# Patient Record
Sex: MALE
[De-identification: names, ages, dates, MRNs, and addresses within clinical notes are randomized per-mention and may not be internally consistent; named-entity substitution may affect disease eponyms.]

---

## 2019-08-13 ENCOUNTER — APPOINTMENT (OUTPATIENT)
Dept: CARDIOLOGY | Facility: CLINIC | Age: 69
End: 2019-08-13
Payer: COMMERCIAL

## 2019-08-13 VITALS
HEART RATE: 61 BPM | BODY MASS INDEX: 25.02 KG/M2 | HEIGHT: 65.35 IN | WEIGHT: 152 LBS | TEMPERATURE: 98.1 F | RESPIRATION RATE: 17 BRPM | SYSTOLIC BLOOD PRESSURE: 123 MMHG | OXYGEN SATURATION: 97 % | DIASTOLIC BLOOD PRESSURE: 73 MMHG

## 2019-08-13 DIAGNOSIS — F17.200 NICOTINE DEPENDENCE, UNSPECIFIED, UNCOMPLICATED: ICD-10-CM

## 2019-08-13 PROCEDURE — 93306 TTE W/DOPPLER COMPLETE: CPT

## 2019-08-13 PROCEDURE — 93015 CV STRESS TEST SUPVJ I&R: CPT

## 2019-08-13 PROCEDURE — ZZZZZ: CPT

## 2019-08-13 PROCEDURE — 99204 OFFICE O/P NEW MOD 45 MIN: CPT | Mod: 25

## 2019-08-13 RX ORDER — TAMSULOSIN HYDROCHLORIDE 0.4 MG/1
0.4 CAPSULE ORAL
Qty: 90 | Refills: 0 | Status: ACTIVE | COMMUNITY
Start: 2019-04-23

## 2019-08-13 RX ORDER — AMLODIPINE BESYLATE 5 MG/1
5 TABLET ORAL
Qty: 30 | Refills: 0 | Status: ACTIVE | COMMUNITY
Start: 2019-04-24

## 2019-08-13 RX ORDER — ACETAMINOPHEN/DIPHENHYDRAMINE 500MG-25MG
81 TABLET ORAL
Qty: 90 | Refills: 0 | Status: ACTIVE | COMMUNITY
Start: 2019-04-24

## 2019-08-13 RX ORDER — FINASTERIDE 5 MG/1
5 TABLET, FILM COATED ORAL
Qty: 90 | Refills: 0 | Status: ACTIVE | COMMUNITY
Start: 2018-10-23

## 2019-08-13 RX ORDER — METFORMIN ER 500 MG 500 MG/1
500 TABLET ORAL
Qty: 90 | Refills: 0 | Status: ACTIVE | COMMUNITY
Start: 2019-04-24

## 2019-08-13 RX ORDER — LISINOPRIL 20 MG/1
20 TABLET ORAL
Qty: 90 | Refills: 0 | Status: ACTIVE | COMMUNITY
Start: 2019-04-24

## 2019-08-13 RX ORDER — ATORVASTATIN CALCIUM 20 MG/1
20 TABLET, FILM COATED ORAL
Qty: 90 | Refills: 0 | Status: ACTIVE | COMMUNITY
Start: 2019-04-24

## 2019-08-15 PROBLEM — F17.200 CURRENT EVERY DAY SMOKER: Status: ACTIVE | Noted: 2019-08-15

## 2019-08-15 NOTE — HISTORY OF PRESENT ILLNESS
[FreeTextEntry1] : 68 year-old male smoker with HTN, DM, HLD, BPH presents for evaluation of CP. Patient reports that for many months he has CP on the L as well as R side that is like sharp and stabbing, not related to exertion lasting seconds. He reports SOB on exertion. Patient denies palpitations. Patient denies h/o syncope. Patient last had CXR over 2 years ago. Patient is on ASA, cholesterol meds. I advised patient to undergo an echocardiogram and a treadmill stress test.

## 2019-08-15 NOTE — PHYSICAL EXAM
[General Appearance - Well Developed] : well developed [Normal Appearance] : normal appearance [Well Groomed] : well groomed [General Appearance - Well Nourished] : well nourished [No Deformities] : no deformities [General Appearance - In No Acute Distress] : no acute distress [Normal Conjunctiva] : the conjunctiva exhibited no abnormalities [Eyelids - No Xanthelasma] : the eyelids demonstrated no xanthelasmas [Normal Oral Mucosa] : normal oral mucosa [No Oral Pallor] : no oral pallor [No Oral Cyanosis] : no oral cyanosis [Normal Jugular Venous A Waves Present] : normal jugular venous A waves present [Normal Jugular Venous V Waves Present] : normal jugular venous V waves present [No Jugular Venous Mendiola A Waves] : no jugular venous mendiola A waves [Heart Rate And Rhythm] : heart rate and rhythm were normal [Heart Sounds] : normal S1 and S2 [Murmurs] : no murmurs present [Respiration, Rhythm And Depth] : normal respiratory rhythm and effort [Exaggerated Use Of Accessory Muscles For Inspiration] : no accessory muscle use [Auscultation Breath Sounds / Voice Sounds] : lungs were clear to auscultation bilaterally [Abdomen Soft] : soft [Abdomen Tenderness] : non-tender [Abdomen Mass (___ Cm)] : no abdominal mass palpated [Abnormal Walk] : normal gait [Gait - Sufficient For Exercise Testing] : the gait was sufficient for exercise testing [Nail Clubbing] : no clubbing of the fingernails [Cyanosis, Localized] : no localized cyanosis [Petechial Hemorrhages (___cm)] : no petechial hemorrhages [] : no ischemic changes [Oriented To Time, Place, And Person] : oriented to person, place, and time [Affect] : the affect was normal [Mood] : the mood was normal [No Anxiety] : not feeling anxious

## 2022-01-06 VITALS
RESPIRATION RATE: 16 BRPM | SYSTOLIC BLOOD PRESSURE: 149 MMHG | OXYGEN SATURATION: 100 % | HEIGHT: 66.14 IN | TEMPERATURE: 97 F | WEIGHT: 149.91 LBS | DIASTOLIC BLOOD PRESSURE: 71 MMHG | HEART RATE: 63 BPM

## 2022-01-06 RX ORDER — CHLORHEXIDINE GLUCONATE 213 G/1000ML
1 SOLUTION TOPICAL ONCE
Refills: 0 | Status: DISCONTINUED | OUTPATIENT
Start: 2022-03-23 | End: 2022-04-06

## 2022-01-06 NOTE — H&P ADULT - ASSESSMENT
70 y/o Mandarin Speaking M, former smoker with PMH of HTN, HLD, DM who presents for left heart cardiac catheterization due to patients risk factors, CCS class III sx, and abnormal NST.           72 y/o Mandarin Speaking M, former smoker with PMH of HTN, HLD, DM who presents for left heart cardiac catheterization due to patients risk factors, CCS class III sx, and abnormal NST.    - EKG:   NSR @ 61 bpm, ICD in III, avF  - ASA:  III         Mallampati: III  - H/H stable:   13.8/40.4    Platelets/Coags stable. Cr: 1.09  - Patient denies hematochieza, hematuria, easy bruising, or signs of bleeding.   - Patient loaded with Aspirin 81 mg PO x 1 and Plavix 600 mg PO x 1   - Patient given 250 cc bolus x 1 and started on IV NS 75 cc/hr x 2 hours   - Patient is a suitable candidate for moderate sedation.     Risks & benefits of procedure and alternative therapy have been explained to the patient including but not limited to: allergic reaction, bleeding w/possible need for blood transfusion, infection, renal and vascular compromise, limb damage, arrhythmia, stroke, vessel dissection/perforation, Myocardial infarction, emergent CABG. Informed consent obtained and in chart.

## 2022-01-06 NOTE — H&P ADULT - NSHPROSALLOTHERNEGRD_GEN_ALL_CORE
What Is The Reason For Today's Visit?: Full Body Skin Examination All other review of systems negative, except as noted in HPI

## 2022-01-06 NOTE — H&P ADULT - HISTORY OF PRESENT ILLNESS
History obtained with help of o/p cardiologist office note.    Cardiologist: Dr. Huizar  [facility] [employer] [facility] [phone]  COVID :  Escort:  Confirm meds on arrival!    70 y/o Mandarin Speaking M, former smoker with PMH of HTN, HLD, DM who presents to his cardiologist Dr. Huizar with CC of intermittent non-radiating substernal chest pain describes it as pressure like which is gradually worsening associated with SOB occurring on ambulating less than 2 blocks or climbing 1 flight of stairs which is worse with exertion and relieved with rest. Pt. denies any dizziness,  palpitation, N/V, LE edema, PND/orthopnea.  NST 8/16/21 revealed small amount of ischemia in inferior location; 1 mm ST depression 2,3, AVF. ECHO7/26/21 revealing EF 70 %, mildly dilated ascending aorta 3.9 cm, AVS with mild calcification, mild to mod TR, mild PI, mildly dilated LA/RA. In light of patients risk factors, CCS class __ sx and abnormal NST, pt. is now referred for cardiac cath with possible intervention   History obtained with help of o/p cardiologist office note  Cardiologist: Dr. Huizar  Pharmacy: [facility] [phone]  COVID: (-) 3/23/22 in Patient's Packet   Escort: Daughter   70 y/o Mandarin Speaking M, former smoker with PMH of HTN, HLD, DM who presents to his cardiologist Dr. Huizar with CC of intermittent non-radiating substernal chest pain describes it as pressure like which is gradually worsening associated with SOB occurring on ambulating less than 2 blocks or climbing 1 flight of stairs which is worse with exertion and relieved with rest. Pt. denies any dizziness,  palpitation, N/V, LE edema, PND/orthopnea.  NST 8/16/21 revealed small amount of ischemia in inferior location; 1 mm ST depression 2,3, AVF. ECHO7/26/21 revealing EF 70 %, mildly dilated ascending aorta 3.9 cm, AVS with mild calcification, mild to mod TR, mild PI, mildly dilated LA/RA. In light of patients risk factors, CCS class III sx, and abnormal NST, pt. is now referred for cardiac cath with possible intervention

## 2022-01-06 NOTE — H&P ADULT - RS GEN PE MLT RESP DETAILS PC
airway patent/good air movement/respirations non-labored/clear to auscultation bilaterally/no chest wall tenderness/no intercostal retractions/no rales

## 2022-03-23 ENCOUNTER — OUTPATIENT (OUTPATIENT)
Dept: OUTPATIENT SERVICES | Facility: HOSPITAL | Age: 72
LOS: 1 days | Discharge: HOME | End: 2022-03-23
Payer: COMMERCIAL

## 2022-03-23 LAB
A1C WITH ESTIMATED AVERAGE GLUCOSE RESULT: 6.2 % — HIGH (ref 4–5.6)
ALBUMIN SERPL ELPH-MCNC: 4.8 G/DL — SIGNIFICANT CHANGE UP (ref 3.3–5)
ALP SERPL-CCNC: 62 U/L — SIGNIFICANT CHANGE UP (ref 40–120)
ALT FLD-CCNC: 29 U/L — SIGNIFICANT CHANGE UP (ref 10–45)
ANION GAP SERPL CALC-SCNC: 9 MMOL/L — SIGNIFICANT CHANGE UP (ref 5–17)
APTT BLD: 33.8 SEC — SIGNIFICANT CHANGE UP (ref 27.5–35.5)
AST SERPL-CCNC: 29 U/L — SIGNIFICANT CHANGE UP (ref 10–40)
BASOPHILS # BLD AUTO: 0.04 K/UL — SIGNIFICANT CHANGE UP (ref 0–0.2)
BASOPHILS NFR BLD AUTO: 0.7 % — SIGNIFICANT CHANGE UP (ref 0–2)
BILIRUB DIRECT SERPL-MCNC: <0.2 MG/DL — SIGNIFICANT CHANGE UP (ref 0–0.3)
BILIRUB INDIRECT FLD-MCNC: SIGNIFICANT CHANGE UP MG/DL (ref 0.2–1)
BILIRUB SERPL-MCNC: 0.6 MG/DL — SIGNIFICANT CHANGE UP (ref 0.2–1.2)
BUN SERPL-MCNC: 19 MG/DL — SIGNIFICANT CHANGE UP (ref 7–23)
CALCIUM SERPL-MCNC: 9.6 MG/DL — SIGNIFICANT CHANGE UP (ref 8.4–10.5)
CHLORIDE SERPL-SCNC: 102 MMOL/L — SIGNIFICANT CHANGE UP (ref 96–108)
CHOLEST SERPL-MCNC: 160 MG/DL — SIGNIFICANT CHANGE UP
CO2 SERPL-SCNC: 26 MMOL/L — SIGNIFICANT CHANGE UP (ref 22–31)
CREAT SERPL-MCNC: 1.09 MG/DL — SIGNIFICANT CHANGE UP (ref 0.5–1.3)
EGFR: 73 ML/MIN/1.73M2 — SIGNIFICANT CHANGE UP
EOSINOPHIL # BLD AUTO: 0.13 K/UL — SIGNIFICANT CHANGE UP (ref 0–0.5)
EOSINOPHIL NFR BLD AUTO: 2.1 % — SIGNIFICANT CHANGE UP (ref 0–6)
ESTIMATED AVERAGE GLUCOSE: 131 MG/DL — HIGH (ref 68–114)
GLUCOSE BLDC GLUCOMTR-MCNC: 95 MG/DL — SIGNIFICANT CHANGE UP (ref 70–99)
GLUCOSE SERPL-MCNC: 113 MG/DL — HIGH (ref 70–99)
HCT VFR BLD CALC: 40.4 % — SIGNIFICANT CHANGE UP (ref 39–50)
HDLC SERPL-MCNC: 50 MG/DL — SIGNIFICANT CHANGE UP
HGB BLD-MCNC: 13.8 G/DL — SIGNIFICANT CHANGE UP (ref 13–17)
IMM GRANULOCYTES NFR BLD AUTO: 0.2 % — SIGNIFICANT CHANGE UP (ref 0–1.5)
INR BLD: 0.96 — SIGNIFICANT CHANGE UP (ref 0.88–1.16)
LIPID PNL WITH DIRECT LDL SERPL: 87 MG/DL — SIGNIFICANT CHANGE UP
LYMPHOCYTES # BLD AUTO: 1.88 K/UL — SIGNIFICANT CHANGE UP (ref 1–3.3)
LYMPHOCYTES # BLD AUTO: 31 % — SIGNIFICANT CHANGE UP (ref 13–44)
MCHC RBC-ENTMCNC: 31.1 PG — SIGNIFICANT CHANGE UP (ref 27–34)
MCHC RBC-ENTMCNC: 34.2 GM/DL — SIGNIFICANT CHANGE UP (ref 32–36)
MCV RBC AUTO: 91 FL — SIGNIFICANT CHANGE UP (ref 80–100)
MONOCYTES # BLD AUTO: 0.51 K/UL — SIGNIFICANT CHANGE UP (ref 0–0.9)
MONOCYTES NFR BLD AUTO: 8.4 % — SIGNIFICANT CHANGE UP (ref 2–14)
NEUTROPHILS # BLD AUTO: 3.49 K/UL — SIGNIFICANT CHANGE UP (ref 1.8–7.4)
NEUTROPHILS NFR BLD AUTO: 57.6 % — SIGNIFICANT CHANGE UP (ref 43–77)
NON HDL CHOLESTEROL: 110 MG/DL — SIGNIFICANT CHANGE UP
NRBC # BLD: 0 /100 WBCS — SIGNIFICANT CHANGE UP (ref 0–0)
PLATELET # BLD AUTO: 220 K/UL — SIGNIFICANT CHANGE UP (ref 150–400)
POTASSIUM SERPL-MCNC: 4.4 MMOL/L — SIGNIFICANT CHANGE UP (ref 3.5–5.3)
POTASSIUM SERPL-SCNC: 4.4 MMOL/L — SIGNIFICANT CHANGE UP (ref 3.5–5.3)
PROT SERPL-MCNC: 7.8 G/DL — SIGNIFICANT CHANGE UP (ref 6–8.3)
PROTHROM AB SERPL-ACNC: 11.4 SEC — SIGNIFICANT CHANGE UP (ref 10.5–13.4)
RBC # BLD: 4.44 M/UL — SIGNIFICANT CHANGE UP (ref 4.2–5.8)
RBC # FLD: 12.3 % — SIGNIFICANT CHANGE UP (ref 10.3–14.5)
SODIUM SERPL-SCNC: 137 MMOL/L — SIGNIFICANT CHANGE UP (ref 135–145)
TRIGL SERPL-MCNC: 114 MG/DL — SIGNIFICANT CHANGE UP
WBC # BLD: 6.06 K/UL — SIGNIFICANT CHANGE UP (ref 3.8–10.5)
WBC # FLD AUTO: 6.06 K/UL — SIGNIFICANT CHANGE UP (ref 3.8–10.5)

## 2022-03-23 PROCEDURE — 93005 ELECTROCARDIOGRAM TRACING: CPT

## 2022-03-23 PROCEDURE — C1769: CPT

## 2022-03-23 PROCEDURE — 85025 COMPLETE CBC W/AUTO DIFF WBC: CPT

## 2022-03-23 PROCEDURE — 99152 MOD SED SAME PHYS/QHP 5/>YRS: CPT

## 2022-03-23 PROCEDURE — 80053 COMPREHEN METABOLIC PANEL: CPT

## 2022-03-23 PROCEDURE — 93458 L HRT ARTERY/VENTRICLE ANGIO: CPT | Mod: 26

## 2022-03-23 PROCEDURE — 93458 L HRT ARTERY/VENTRICLE ANGIO: CPT

## 2022-03-23 PROCEDURE — C1887: CPT

## 2022-03-23 PROCEDURE — 82962 GLUCOSE BLOOD TEST: CPT

## 2022-03-23 PROCEDURE — 82248 BILIRUBIN DIRECT: CPT

## 2022-03-23 PROCEDURE — C1894: CPT

## 2022-03-23 PROCEDURE — 85730 THROMBOPLASTIN TIME PARTIAL: CPT

## 2022-03-23 PROCEDURE — 93010 ELECTROCARDIOGRAM REPORT: CPT

## 2022-03-23 PROCEDURE — 85610 PROTHROMBIN TIME: CPT

## 2022-03-23 PROCEDURE — 83036 HEMOGLOBIN GLYCOSYLATED A1C: CPT

## 2022-03-23 PROCEDURE — 80061 LIPID PANEL: CPT

## 2022-03-23 RX ORDER — DEXTROSE 50 % IN WATER 50 %
12.5 SYRINGE (ML) INTRAVENOUS ONCE
Refills: 0 | Status: DISCONTINUED | OUTPATIENT
Start: 2022-03-23 | End: 2022-04-06

## 2022-03-23 RX ORDER — INSULIN LISPRO 100/ML
VIAL (ML) SUBCUTANEOUS ONCE
Refills: 0 | Status: DISCONTINUED | OUTPATIENT
Start: 2022-03-23 | End: 2022-04-06

## 2022-03-23 RX ORDER — GLUCAGON INJECTION, SOLUTION 0.5 MG/.1ML
1 INJECTION, SOLUTION SUBCUTANEOUS ONCE
Refills: 0 | Status: DISCONTINUED | OUTPATIENT
Start: 2022-03-23 | End: 2022-04-06

## 2022-03-23 RX ORDER — SODIUM CHLORIDE 9 MG/ML
1000 INJECTION, SOLUTION INTRAVENOUS
Refills: 0 | Status: DISCONTINUED | OUTPATIENT
Start: 2022-03-23 | End: 2022-04-06

## 2022-03-23 RX ORDER — CLOPIDOGREL BISULFATE 75 MG/1
600 TABLET, FILM COATED ORAL ONCE
Refills: 0 | Status: COMPLETED | OUTPATIENT
Start: 2022-03-23 | End: 2022-03-23

## 2022-03-23 RX ORDER — DEXTROSE 50 % IN WATER 50 %
15 SYRINGE (ML) INTRAVENOUS ONCE
Refills: 0 | Status: DISCONTINUED | OUTPATIENT
Start: 2022-03-23 | End: 2022-04-06

## 2022-03-23 RX ORDER — DEXTROSE 50 % IN WATER 50 %
25 SYRINGE (ML) INTRAVENOUS ONCE
Refills: 0 | Status: DISCONTINUED | OUTPATIENT
Start: 2022-03-23 | End: 2022-04-06

## 2022-03-23 RX ORDER — SODIUM CHLORIDE 9 MG/ML
250 INJECTION INTRAMUSCULAR; INTRAVENOUS; SUBCUTANEOUS ONCE
Refills: 0 | Status: COMPLETED | OUTPATIENT
Start: 2022-03-23 | End: 2022-03-23

## 2022-03-23 RX ORDER — SODIUM CHLORIDE 9 MG/ML
500 INJECTION INTRAMUSCULAR; INTRAVENOUS; SUBCUTANEOUS
Refills: 0 | Status: DISCONTINUED | OUTPATIENT
Start: 2022-03-23 | End: 2022-04-06

## 2022-03-23 RX ORDER — ASPIRIN/CALCIUM CARB/MAGNESIUM 324 MG
81 TABLET ORAL ONCE
Refills: 0 | Status: COMPLETED | OUTPATIENT
Start: 2022-03-23 | End: 2022-03-23

## 2022-03-23 RX ADMIN — SODIUM CHLORIDE 210 MILLILITER(S): 9 INJECTION INTRAMUSCULAR; INTRAVENOUS; SUBCUTANEOUS at 13:30

## 2022-03-23 RX ADMIN — SODIUM CHLORIDE 250 MILLILITER(S): 9 INJECTION INTRAMUSCULAR; INTRAVENOUS; SUBCUTANEOUS at 11:32

## 2022-03-23 RX ADMIN — CLOPIDOGREL BISULFATE 600 MILLIGRAM(S): 75 TABLET, FILM COATED ORAL at 11:37

## 2022-03-23 RX ADMIN — Medication 81 MILLIGRAM(S): at 11:37

## 2022-03-23 NOTE — PROGRESS NOTE ADULT - SUBJECTIVE AND OBJECTIVE BOX
Interventional Cardiology PA SDA Discharge Note    Patient without complaints. Ambulated and voided without difficulties    Afebrile, VSS    Ext:  Right             Radial :   no hematoma,    no bleeding, dressing; C/D/I      Pulses:    intact RAD to baseline     A/P:    70 y/o Mandarin Speaking M, former smoker with PMH of HTN, HLD, DM who presents to his cardiologist Dr. Huizar with CC of intermittent non-radiating substernal chest pain describes it as pressure like which is gradually worsening associated with SOB occurring on ambulating less than 2 blocks or climbing 1 flight of stairs which is worse with exertion and relieved with rest. Pt. denies any dizziness,  palpitation, N/V, LE edema, PND/orthopnea.  NST 8/16/21 revealed small amount of ischemia in inferior location; 1 mm ST depression 2,3, AVF. ECHO7/26/21 revealing EF 70 %, mildly dilated ascending aorta 3.9 cm, AVS with mild calcification, mild to mod TR, mild PI, mildly dilated LA/RA. In light of patients risk factors, CCS class III sx, and abnormal NST, pt. is now referred for cardiac cath with possible intervention    - EKG:   NSR @ 61 bpm, ICD in III, avF  - ASA:  III         Mallampati: III  - H/H stable:   13.8/40.4    Platelets/Coags stable. Cr: 1.09  - Patient denies hematochieza, hematuria, easy bruising, or signs of bleeding.   - Patient loaded with Aspirin 81 mg PO x 1 and Plavix 600 mg PO x 1   - Patient given 250 cc bolus x 1 and started on IV NS 75 cc/hr x 2 hours   - Patient is a suitable candidate for moderate sedation.     Cardiac cath 3/23/22: Non-obstructive CAD (full cath report pending); LVEDP 6mmHg.  -Access: R radial artery s/p TR band; no hematoma/bleed.    - Post cath IV NS @210cc/hr x2hrs ordered per protocol.    Pt instructed to hold home Metformin until Saturday, 3/26/22, at which time he should resume taking it as prescribed.    1.	Stable for discharge as per attending Dr. Huizar after bed rest, pt voids, groin/wrist stable and 30 minutes of ambulation.  2.	Follow-up with PMD/Cardiologist Dr. Huizar in 1-2 weeks  3.	Discharged forms signed and copies in chart

## 2022-03-28 DIAGNOSIS — I25.110 ATHEROSCLEROTIC HEART DISEASE OF NATIVE CORONARY ARTERY WITH UNSTABLE ANGINA PECTORIS: ICD-10-CM

## 2022-03-28 DIAGNOSIS — R94.39 ABNORMAL RESULT OF OTHER CARDIOVASCULAR FUNCTION STUDY: ICD-10-CM

## 2022-10-03 ENCOUNTER — APPOINTMENT (OUTPATIENT)
Dept: UROLOGY | Facility: CLINIC | Age: 72
End: 2022-10-03

## 2022-10-03 VITALS
TEMPERATURE: 97.3 F | DIASTOLIC BLOOD PRESSURE: 77 MMHG | RESPIRATION RATE: 18 BRPM | HEART RATE: 63 BPM | HEIGHT: 64.96 IN | OXYGEN SATURATION: 98 % | WEIGHT: 154 LBS | SYSTOLIC BLOOD PRESSURE: 132 MMHG | BODY MASS INDEX: 25.66 KG/M2

## 2022-10-03 DIAGNOSIS — F17.200 NICOTINE DEPENDENCE, UNSPECIFIED, UNCOMPLICATED: ICD-10-CM

## 2022-10-03 DIAGNOSIS — Z78.9 OTHER SPECIFIED HEALTH STATUS: ICD-10-CM

## 2022-10-03 PROCEDURE — 99204 OFFICE O/P NEW MOD 45 MIN: CPT

## 2022-10-03 RX ORDER — ENEMA 19; 7 G/133ML; G/133ML
7-19 ENEMA RECTAL
Qty: 133 | Refills: 0 | Status: ACTIVE | COMMUNITY
Start: 2022-10-03 | End: 1900-01-01

## 2022-10-03 NOTE — PHYSICAL EXAM
[General Appearance - Well Developed] : well developed [General Appearance - Well Nourished] : well nourished [Normal Appearance] : normal appearance [Well Groomed] : well groomed [General Appearance - In No Acute Distress] : no acute distress [Edema] : no peripheral edema [Respiration, Rhythm And Depth] : normal respiratory rhythm and effort [Exaggerated Use Of Accessory Muscles For Inspiration] : no accessory muscle use [Abdomen Soft] : soft [Abdomen Tenderness] : non-tender [Costovertebral Angle Tenderness] : no ~M costovertebral angle tenderness [Urethral Meatus] : meatus normal [Penis Abnormality] : normal uncircumcised penis [Urinary Bladder Findings] : the bladder was normal on palpation [Scrotum] : the scrotum was normal [Testes Tenderness] : no tenderness of the testes [Testes Mass (___cm)] : there were no testicular masses [Prostate Tenderness] : the prostate was not tender [No Prostate Nodules] : no prostate nodules [Prostate Size ___ gm] : prostate size [unfilled] gm [Normal Station and Gait] : the gait and station were normal for the patient's age [] : no rash [No Focal Deficits] : no focal deficits [Oriented To Time, Place, And Person] : oriented to person, place, and time [Affect] : the affect was normal [Mood] : the mood was normal [Not Anxious] : not anxious

## 2022-10-06 ENCOUNTER — APPOINTMENT (OUTPATIENT)
Age: 72
End: 2022-10-06

## 2022-10-06 LAB — PSA SERPL-MCNC: 6.87 NG/ML

## 2022-10-20 NOTE — HISTORY OF PRESENT ILLNESS
[FreeTextEntry1] : Patient is a 70 yo M who presents for BPH/LUTS and elevated PSA.\par \par He had recent labs with PCP showing PSA of 4.91 in 9/2022.  He denies any knowledge of prior elevated PSAs.\par \par He also reports recently worsened LUTS over past year.  Notes nocturia x2, urinary frequency, urgency.  Rare UUI.  Denies dysuria, hematuria.  Overall good FOS.\par \par He previously took flomax and finasteride.  However he states he stopped these medications about 2 years ago.  Currently not taking any BPH medications.

## 2022-10-20 NOTE — ASSESSMENT
[FreeTextEntry1] : Patient is a 70 yo M who presents for BPH/LUTs and elevated PSA.\par \par Previously on combo BPH therapy but stopped as he was better, now worsened LUTS.\par Combo BPH therapy re-initiated, rxs sent\par Repeat PSA today\par Prostate MRI\par F/u after MRI

## 2022-10-20 NOTE — ADDENDUM
[FreeTextEntry1] : Called and spoke to pt re MRI results\par Equivocal PiRADs 3 lesion, 43cc prostate.  Repeat PSA was 6.87.\par D/w pt that recommend he follow up with my partner Dr Waldrop who can perform MRI fusion transperineal bx in our [place] office\par He will need to arrange transport, but states willing to proceed\par Dr Waldrop/team notified and to arrange appt

## 2022-10-21 ENCOUNTER — NON-APPOINTMENT (OUTPATIENT)
Age: 72
End: 2022-10-21

## 2022-10-21 RX ORDER — SODIUM PHOSPHATE, DIBASIC AND SODIUM PHOSPHATE, MONOBASIC 7; 19 G/230ML; G/230ML
ENEMA RECTAL
Qty: 1 | Refills: 0 | Status: ACTIVE | COMMUNITY
Start: 2022-10-21 | End: 1900-01-01

## 2022-10-25 ENCOUNTER — NON-APPOINTMENT (OUTPATIENT)
Age: 72
End: 2022-10-25

## 2022-10-25 ENCOUNTER — OUTPATIENT (OUTPATIENT)
Dept: OUTPATIENT SERVICES | Facility: HOSPITAL | Age: 72
LOS: 1 days | End: 2022-10-25
Payer: SELF-PAY

## 2022-10-25 DIAGNOSIS — Z00.8 ENCOUNTER FOR OTHER GENERAL EXAMINATION: ICD-10-CM

## 2022-10-25 PROCEDURE — C8001: CPT

## 2022-11-06 DIAGNOSIS — Z00.00 ENCOUNTER FOR GENERAL ADULT MEDICAL EXAMINATION W/OUT ABNORMAL FINDINGS: ICD-10-CM

## 2022-11-07 ENCOUNTER — APPOINTMENT (OUTPATIENT)
Dept: UROLOGY | Facility: CLINIC | Age: 72
End: 2022-11-07

## 2022-11-07 ENCOUNTER — OUTPATIENT (OUTPATIENT)
Dept: OUTPATIENT SERVICES | Facility: HOSPITAL | Age: 72
LOS: 1 days | End: 2022-11-07
Payer: COMMERCIAL

## 2022-11-07 DIAGNOSIS — R35.0 FREQUENCY OF MICTURITION: ICD-10-CM

## 2022-11-07 PROCEDURE — 76377 3D RENDER W/INTRP POSTPROCES: CPT | Mod: 26

## 2022-11-07 PROCEDURE — 76872 US TRANSRECTAL: CPT | Mod: 26

## 2022-11-07 PROCEDURE — 76942 ECHO GUIDE FOR BIOPSY: CPT | Mod: 26,59

## 2022-11-07 PROCEDURE — 55700: CPT

## 2022-11-07 PROCEDURE — 76872 US TRANSRECTAL: CPT

## 2022-11-08 ENCOUNTER — NON-APPOINTMENT (OUTPATIENT)
Age: 72
End: 2022-11-08

## 2022-11-08 DIAGNOSIS — R97.20 ELEVATED PROSTATE SPECIFIC ANTIGEN [PSA]: ICD-10-CM

## 2022-11-15 ENCOUNTER — APPOINTMENT (OUTPATIENT)
Dept: UROLOGY | Facility: CLINIC | Age: 72
End: 2022-11-15

## 2022-11-15 VITALS
SYSTOLIC BLOOD PRESSURE: 150 MMHG | HEART RATE: 69 BPM | RESPIRATION RATE: 17 BRPM | HEIGHT: 68 IN | WEIGHT: 168 LBS | BODY MASS INDEX: 25.46 KG/M2 | DIASTOLIC BLOOD PRESSURE: 75 MMHG

## 2022-11-15 PROCEDURE — 99214 OFFICE O/P EST MOD 30 MIN: CPT

## 2022-11-15 NOTE — HISTORY OF PRESENT ILLNESS
[FreeTextEntry1] : Patient is a 70 yo M who presents for BPH/LUTS and elevated PSA.\par \par He had recent labs with PCP showing PSA of 4.91 in 9/2022.  He denies any knowledge of prior elevated PSAs.\par Repeat PSA 6.87 - he underwent prostate MRI showing PiRADs 3 43cc gland.\par He underwent transperineal fusion pbx.  Pathology showed diffuse R sided cancer, [place] 6 and Linda 3+4\par \par He also reports recently worsened LUTS over past year.  Notes nocturia x2, urinary frequency, urgency.  Rare UUI.  Denies dysuria, hematuria.  Overall good FOS.\par \par He previously took flomax and finasteride.  However he states he stopped these medications about 2 years ago.  Currently not taking any BPH medications.

## 2022-11-15 NOTE — ASSESSMENT
[FreeTextEntry1] : Patient is a 70 yo M who presents for BPH/LUTs and elevated PSA.\par S/p prostate MRI with PiRADs 3 43cc gland.\par He underwent transperineal fusion pbx.  Pathology showed diffuse R sided cancer, Linda 6 and Linda 3+4\par \par Pathology results reviewed with pt\par He is favorable-intermediate risk GG2.\par I had a long discussion with pt regarding his prostate cancer diagnosis, implications and options for tx of his disease, including AS, surgery, ablation, and radiation. Given his life expectancy likely exceeds >10yrs, I recommend treatment either with surgery, ablative/cryo therapy or radiation. \par I discussed that there are risks of both radiation and surgery.  Surgical risks are immediate and improve with time, whereas radiation risks increase with time.  I discuss with pt that risks of incontinence are immediate but most pts regain incontinence over time.  Radiation will initially have minimal side effects, but they develop over >5 yrs.  However radiation side effects are very difficult to treat. \par Cryo similar SE to radiation although potentiallly less\par \par He wishes to discuss options further.\par Refer to Rad Onc\par Refer to Dr Jon for discussion of cryo, RALP\par Fu Dr Jon

## 2022-11-18 PROBLEM — Z00.00 ENCOUNTER FOR PREVENTIVE HEALTH EXAMINATION: Noted: 2022-11-18

## 2022-11-29 ENCOUNTER — APPOINTMENT (OUTPATIENT)
Dept: UROLOGY | Facility: CLINIC | Age: 72
End: 2022-11-29

## 2022-11-29 PROCEDURE — 99215 OFFICE O/P EST HI 40 MIN: CPT

## 2022-12-03 NOTE — DISEASE MANAGEMENT
[1] : T1 [c] : c [0] : N0 [X] : MX [Biopsy with Fusion] : Patient had a biopsy with fusion on [7(3+4)] : Template Biopsy [place] Score: 7(3+4) [Biopsy results sent to PCP/Referring Physician] : Biopsy results sent to PCP/Referring Physician [] : Patient had a Prostate MRI [3] : 3 [IIB] : IIB [BiopsyDate] : 11/22 [MeasuredProstateVolume] : 43.2 [TotalCores] : 14 [TotalPositiveCores] : 5 [MaxCoreInvolvement] : 50

## 2022-12-03 NOTE — LETTER CLOSING
[Consult Closing:] : Thank you for allowing me to participate in the care of this patient.  If you have any questions, please do not hesitate to contact me. [FreeTextEntry3] : Sincerely,\par \par \par \par \par Bear Jon MD, FACS\par Chief of Urology, [facility]\par  of Urology\par Director of Robotic and Laparoscopic Surgery\par [facility]\par \par [facility]\par [address] [place] [address]\[place] [address]\par P: [phone]\par F: [phone]\par [facility]

## 2022-12-03 NOTE — HISTORY OF PRESENT ILLNESS
[FreeTextEntry1] : The entirety of the visit today was conducted with a Chinese  and the patient was present along with his wife and son-in-law.\par \par Gerardo Gross presents to the office today.  He is a 72-year-old man recently diagnosed with Linda 3+4 adenocarcinoma the prostate.  His biopsy was done at the beginning of this month.  He had an MRI target lesion, PI-RADS category 3 measuring 5 mm on MRI done earlier this year.  The targeted biopsies were negative but he did have several samples from the right aspect of the prostate with Linda 3+4.  These involve the right posterior medial apex and base as well as the left posterior lateral apex and base.  A sample from the right anterior prostate was also positive.  In total, 5 samples from the template biopsy were positive, all from the right side of the prostate.  \par \par The prostate measures 43 cm³ on MRI.  Baseline urinary symptoms include nocturia x2 along with urinary frequency and urgency.  He is not currently on any medical treatment for urinary symptoms although previously he had taken Flomax and finasteride.\par \par Dr. Reyes has already reviewed the pathology results from the recent biopsy with the patient.  The patient has been referred to radiation oncology to discuss potential radiation treatment options for this prostate cancer and he is here with me today to discuss either focal cryoablation or radical prostatectomy to manage this prostate cancer.

## 2022-12-03 NOTE — LETTER GREETING
[Dear  ___] : Dear  [unfilled], [Follow-Up] : Your patient, [unfilled] was seen in my office today for follow-up [Please see my note below.] : Please see my note below. [FreeTextEntry2] : Kamini Gross. DO\par [address] [place] [address]

## 2022-12-13 ENCOUNTER — OUTPATIENT (OUTPATIENT)
Dept: OUTPATIENT SERVICES | Facility: HOSPITAL | Age: 72
LOS: 1 days | End: 2022-12-13

## 2022-12-13 VITALS
OXYGEN SATURATION: 98 % | DIASTOLIC BLOOD PRESSURE: 82 MMHG | RESPIRATION RATE: 16 BRPM | HEART RATE: 67 BPM | SYSTOLIC BLOOD PRESSURE: 163 MMHG | WEIGHT: 149.91 LBS | HEIGHT: 65 IN | TEMPERATURE: 99 F

## 2022-12-13 DIAGNOSIS — C61 MALIGNANT NEOPLASM OF PROSTATE: ICD-10-CM

## 2022-12-13 DIAGNOSIS — I10 ESSENTIAL (PRIMARY) HYPERTENSION: ICD-10-CM

## 2022-12-13 DIAGNOSIS — E11.9 TYPE 2 DIABETES MELLITUS WITHOUT COMPLICATIONS: ICD-10-CM

## 2022-12-13 DIAGNOSIS — K08.89 OTHER SPECIFIED DISORDERS OF TEETH AND SUPPORTING STRUCTURES: ICD-10-CM

## 2022-12-13 LAB
A1C WITH ESTIMATED AVERAGE GLUCOSE RESULT: 6.1 % — HIGH (ref 4–5.6)
ALBUMIN SERPL ELPH-MCNC: 5 G/DL — SIGNIFICANT CHANGE UP (ref 3.3–5)
ALP SERPL-CCNC: 71 U/L — SIGNIFICANT CHANGE UP (ref 40–120)
ALT FLD-CCNC: 18 U/L — SIGNIFICANT CHANGE UP (ref 4–41)
ANION GAP SERPL CALC-SCNC: 12 MMOL/L — SIGNIFICANT CHANGE UP (ref 7–14)
AST SERPL-CCNC: 19 U/L — SIGNIFICANT CHANGE UP (ref 4–40)
BILIRUB SERPL-MCNC: 0.6 MG/DL — SIGNIFICANT CHANGE UP (ref 0.2–1.2)
BUN SERPL-MCNC: 16 MG/DL — SIGNIFICANT CHANGE UP (ref 7–23)
CALCIUM SERPL-MCNC: 10.3 MG/DL — SIGNIFICANT CHANGE UP (ref 8.4–10.5)
CHLORIDE SERPL-SCNC: 96 MMOL/L — LOW (ref 98–107)
CO2 SERPL-SCNC: 25 MMOL/L — SIGNIFICANT CHANGE UP (ref 22–31)
CREAT SERPL-MCNC: 1.19 MG/DL — SIGNIFICANT CHANGE UP (ref 0.5–1.3)
EGFR: 66 ML/MIN/1.73M2 — SIGNIFICANT CHANGE UP
ESTIMATED AVERAGE GLUCOSE: 128 — SIGNIFICANT CHANGE UP
GLUCOSE SERPL-MCNC: 131 MG/DL — HIGH (ref 70–99)
HCT VFR BLD CALC: 39.7 % — SIGNIFICANT CHANGE UP (ref 39–50)
HGB BLD-MCNC: 13.1 G/DL — SIGNIFICANT CHANGE UP (ref 13–17)
MCHC RBC-ENTMCNC: 30 PG — SIGNIFICANT CHANGE UP (ref 27–34)
MCHC RBC-ENTMCNC: 33 GM/DL — SIGNIFICANT CHANGE UP (ref 32–36)
MCV RBC AUTO: 90.8 FL — SIGNIFICANT CHANGE UP (ref 80–100)
NRBC # BLD: 0 /100 WBCS — SIGNIFICANT CHANGE UP (ref 0–0)
NRBC # FLD: 0 K/UL — SIGNIFICANT CHANGE UP (ref 0–0)
PLATELET # BLD AUTO: 238 K/UL — SIGNIFICANT CHANGE UP (ref 150–400)
POTASSIUM SERPL-MCNC: 4.4 MMOL/L — SIGNIFICANT CHANGE UP (ref 3.5–5.3)
POTASSIUM SERPL-SCNC: 4.4 MMOL/L — SIGNIFICANT CHANGE UP (ref 3.5–5.3)
PROT SERPL-MCNC: 7.9 G/DL — SIGNIFICANT CHANGE UP (ref 6–8.3)
RBC # BLD: 4.37 M/UL — SIGNIFICANT CHANGE UP (ref 4.2–5.8)
RBC # FLD: 12.1 % — SIGNIFICANT CHANGE UP (ref 10.3–14.5)
SODIUM SERPL-SCNC: 133 MMOL/L — LOW (ref 135–145)
WBC # BLD: 6.51 K/UL — SIGNIFICANT CHANGE UP (ref 3.8–10.5)
WBC # FLD AUTO: 6.51 K/UL — SIGNIFICANT CHANGE UP (ref 3.8–10.5)

## 2022-12-13 PROCEDURE — 93010 ELECTROCARDIOGRAM REPORT: CPT

## 2022-12-13 RX ORDER — AMLODIPINE BESYLATE 2.5 MG/1
1 TABLET ORAL
Qty: 0 | Refills: 0 | DISCHARGE

## 2022-12-13 RX ORDER — METOPROLOL TARTRATE 50 MG
1 TABLET ORAL
Qty: 0 | Refills: 0 | DISCHARGE

## 2022-12-13 NOTE — H&P PST ADULT - OTHER CARE PROVIDERS
Dr. Connelly or Dr. Garcia,, Dr. Jay Ochoa  Dentist [phone] Dr. Connelly or Dr. Garcia,, Dr. Jay Ochoa  [facility] [phone]; Dr. Connelly or Dr. Garcia,, Dr. Jay Ochoa  Dentist [phone]

## 2022-12-13 NOTE — H&P PST ADULT - LAST CARDIAC ANGIOGRAM/IMAGING
"they stuck a needle In my arm to check my heart, it was this year" "some hospital in [place]" 2022

## 2022-12-13 NOTE — H&P PST ADULT - NSANTHOSAYNRD_GEN_A_CORE
No. MAEVE screening performed.  STOP BANG Legend: 0-2 = LOW Risk; 3-4 = INTERMEDIATE Risk; 5-8 = HIGH Risk

## 2022-12-13 NOTE — H&P PST ADULT - NSICDXPASTMEDICALHX_GEN_ALL_CORE_FT
PAST MEDICAL HISTORY:  HTN (hypertension)     Hyperlipidemia     Prostate cancer     Type II diabetes mellitus

## 2022-12-20 ENCOUNTER — APPOINTMENT (OUTPATIENT)
Dept: UROLOGY | Facility: CLINIC | Age: 72
End: 2022-12-20

## 2022-12-20 VITALS
WEIGHT: 152 LBS | HEART RATE: 69 BPM | BODY MASS INDEX: 23.11 KG/M2 | TEMPERATURE: 96.8 F | DIASTOLIC BLOOD PRESSURE: 81 MMHG | OXYGEN SATURATION: 98 % | RESPIRATION RATE: 16 BRPM | SYSTOLIC BLOOD PRESSURE: 136 MMHG

## 2022-12-20 PROCEDURE — 99214 OFFICE O/P EST MOD 30 MIN: CPT

## 2022-12-20 RX ORDER — SULFAMETHOXAZOLE AND TRIMETHOPRIM 800; 160 MG/1; MG/1
800-160 TABLET ORAL
Qty: 14 | Refills: 0 | Status: ACTIVE | COMMUNITY
Start: 2022-12-20 | End: 1900-01-01

## 2022-12-21 LAB
APPEARANCE: CLEAR
BACTERIA: NEGATIVE
BILIRUBIN URINE: NEGATIVE
BLOOD URINE: NEGATIVE
COLOR: NORMAL
GLUCOSE QUALITATIVE U: NEGATIVE
HYALINE CASTS: 0 /LPF
KETONES URINE: NEGATIVE
LEUKOCYTE ESTERASE URINE: NEGATIVE
MICROSCOPIC-UA: NORMAL
NITRITE URINE: NEGATIVE
PH URINE: 6.5
PROTEIN URINE: NEGATIVE
RED BLOOD CELLS URINE: 4 /HPF
SPECIFIC GRAVITY URINE: 1.01
SQUAMOUS EPITHELIAL CELLS: 0 /HPF
UROBILINOGEN URINE: NORMAL
WHITE BLOOD CELLS URINE: 0 /HPF

## 2022-12-21 NOTE — ADDENDUM
[FreeTextEntry1] : Entered by CAMRYN PALUMBO, acting as scribe for Dr. Danish Waldrop.\par The documentation recorded by the scribe accurately reflects the service I personally performed and the decisions made by me.

## 2022-12-21 NOTE — LETTER BODY
[FreeTextEntry1] : Kamini Gross DO\par [address],\[place] [address]\par [phone]\par \par Dear Dr. Gross,\par \par Reason for visit: Prostate cancer. Perineal discomfort.\par \par This is a 72 year old gentleman with elevated PSA, status post prostate biopsy 6 weeks ago. The patient returns today for follow up. He denies any side effects or difficulties from the prostate biopsy. The patient reports he is scheduled for cryoablation with Dr. Jon next week. Patient states he has had chronic perineal discomfort for the past several years.  He reports experiencing increased perineal discomfort this week. He reports his discomfort is intermittent but has become more significant in the past couple of days. He denies any fevers or chills. He denies any pain.Patient denies any changes in health. The past medical history and family history and social history are unchanged. All other review of systems are negative. Patient denies any changes in medications. Medication list was reconciled.\par \par On examination, the patient is a healthy-appearing gentleman in no acute distress. He is alert and oriented follows commands. He has normal mood and affect. He is normocephalic. Neck is supple. Oral no thrush Respirations are unlabored. His abdomen is soft and nontender. Bladder is nonpalpable. No CVA tenderness. Neurologically he is grossly intact. No peripheral edema. Skin without gross abnormality. He has normal male external genitalia. Normal meatus. Bilateral testes are descended intrascrotally and normal to palpation. On rectal examination, there is normal sphincter tone. The prostate is clinically benign without focal induration or nodularity.\par \par Assessment: Prostate cancer. Perineal discomfort. Possible prostatitis. \par \par I counseled the patient. In terms of his perineal discomfort, I recommended the patient start a course of Bactrim. I discussed the potential side effects of the medication. I counseled the patient on its use and side effects. If the patient develops any side effects, the patient will discontinue the medication and contact me. I also recommended he obtain urinalysis and urine culture to evaluate for infection. The patient will follow-up as directed and will contact me with any questions or concerns or he has made a decision regarding the treatment of his prostate cancer.\par \par Plan: Course of Bactrim. Urinalysis. Urine culture. Followup as directed.

## 2022-12-22 LAB — BACTERIA UR CULT: NORMAL

## 2022-12-26 LAB — SARS-COV-2 N GENE NPH QL NAA+PROBE: NOT DETECTED

## 2022-12-27 ENCOUNTER — APPOINTMENT (OUTPATIENT)
Dept: UROLOGY | Facility: AMBULATORY SURGERY CENTER | Age: 72
End: 2022-12-27

## 2023-01-05 PROBLEM — C61 MALIGNANT NEOPLASM OF PROSTATE: Chronic | Status: ACTIVE | Noted: 2022-12-13

## 2023-01-05 PROBLEM — E11.9 TYPE 2 DIABETES MELLITUS WITHOUT COMPLICATIONS: Chronic | Status: ACTIVE | Noted: 2022-12-13

## 2023-01-05 PROBLEM — I10 ESSENTIAL (PRIMARY) HYPERTENSION: Chronic | Status: ACTIVE | Noted: 2022-12-13

## 2023-01-05 PROBLEM — E78.5 HYPERLIPIDEMIA, UNSPECIFIED: Chronic | Status: ACTIVE | Noted: 2022-12-13

## 2023-01-06 ENCOUNTER — APPOINTMENT (OUTPATIENT)
Dept: UROLOGY | Facility: CLINIC | Age: 73
End: 2023-01-06
Payer: COMMERCIAL

## 2023-01-06 VITALS
DIASTOLIC BLOOD PRESSURE: 73 MMHG | TEMPERATURE: 98 F | WEIGHT: 154 LBS | SYSTOLIC BLOOD PRESSURE: 127 MMHG | BODY MASS INDEX: 23.42 KG/M2 | RESPIRATION RATE: 16 BRPM | OXYGEN SATURATION: 99 % | HEART RATE: 68 BPM

## 2023-01-06 DIAGNOSIS — R07.89 OTHER CHEST PAIN: ICD-10-CM

## 2023-01-06 DIAGNOSIS — R10.2 PELVIC AND PERINEAL PAIN: ICD-10-CM

## 2023-01-06 PROCEDURE — 99214 OFFICE O/P EST MOD 30 MIN: CPT

## 2023-01-06 RX ORDER — GABAPENTIN 300 MG/1
300 CAPSULE ORAL
Qty: 30 | Refills: 3 | Status: DISCONTINUED | COMMUNITY
Start: 2023-01-06 | End: 2023-01-06

## 2023-01-07 NOTE — LETTER BODY
[FreeTextEntry1] : Kamini Gross DO\par [address],\[place] [address]\par [phone]\par \par Dear Dr. Gross,\par \par Reason for visit: Prostate cancer. Perineal discomfort.\par \par This is a 72 year old gentleman with elevated PSA, status post prostate biopsy 2 months ago. The patient returns today for follow up. Since he was last seen, the patient reports he is scheduled for cryoablation with Dr. Jon. Patient states he has had chronic perineal discomfort for the past several years. He reports experiencing increased perineal discomfort this month. He reports his discomfort is intermittent but has become more significant recently. He denies any fevers or chills. He denies any pain.Patient denies any changes in health. The past medical history and family history and social history are unchanged. All other review of systems are negative. Patient denies any changes in medications. Medication list was reconciled.\par \par On examination, the patient is a healthy-appearing gentleman in no acute distress. He is alert and oriented follows commands. He has normal mood and affect. He is normocephalic. Neck is supple. Oral no thrush Respirations are unlabored. His abdomen is soft and nontender. Bladder is nonpalpable. No CVA tenderness. Neurologically he is grossly intact. No peripheral edema. Skin without gross abnormality. He has normal male external genitalia. Normal meatus. Bilateral testes are descended intrascrotally and normal to palpation. On rectal examination, there is normal sphincter tone. The prostate is clinically benign without focal induration or nodularity.\par \par Assessment: Prostate cancer. Perineal discomfort. Possible prostatitis. Chronic pelvic pain syndroe. \par \par I counseled the patient. In terms of his perineal discomfort, I recommended the patient start a trial of Neurontin. I discussed the potential side effects of the medication. I counseled the patient on its use and side effects. If the patient develops any side effects, the patient will discontinue the medication and contact me. I also recommended he obtain urinalysis and urine culture to evaluate for infection. The patient will follow-up as directed and will contact me with any questions or concerns or he has made a decision regarding the treatment of his prostate cancer.\par \par Plan: Trial of Neurontin. Followup as directed.

## 2023-01-11 ENCOUNTER — OUTPATIENT (OUTPATIENT)
Dept: OUTPATIENT SERVICES | Facility: HOSPITAL | Age: 73
LOS: 1 days | End: 2023-01-11
Payer: COMMERCIAL

## 2023-01-11 VITALS
OXYGEN SATURATION: 98 % | RESPIRATION RATE: 16 BRPM | SYSTOLIC BLOOD PRESSURE: 130 MMHG | WEIGHT: 147.93 LBS | HEART RATE: 68 BPM | HEIGHT: 65 IN | DIASTOLIC BLOOD PRESSURE: 76 MMHG

## 2023-01-11 DIAGNOSIS — I10 ESSENTIAL (PRIMARY) HYPERTENSION: ICD-10-CM

## 2023-01-11 DIAGNOSIS — C61 MALIGNANT NEOPLASM OF PROSTATE: ICD-10-CM

## 2023-01-11 DIAGNOSIS — E11.9 TYPE 2 DIABETES MELLITUS WITHOUT COMPLICATIONS: ICD-10-CM

## 2023-01-11 LAB
ALBUMIN SERPL ELPH-MCNC: 4.7 G/DL — SIGNIFICANT CHANGE UP (ref 3.3–5)
ALP SERPL-CCNC: 59 U/L — SIGNIFICANT CHANGE UP (ref 40–120)
ALT FLD-CCNC: 20 U/L — SIGNIFICANT CHANGE UP (ref 4–41)
ANION GAP SERPL CALC-SCNC: 9 MMOL/L — SIGNIFICANT CHANGE UP (ref 7–14)
AST SERPL-CCNC: 22 U/L — SIGNIFICANT CHANGE UP (ref 4–40)
BILIRUB SERPL-MCNC: 0.4 MG/DL — SIGNIFICANT CHANGE UP (ref 0.2–1.2)
BUN SERPL-MCNC: 26 MG/DL — HIGH (ref 7–23)
CALCIUM SERPL-MCNC: 10.2 MG/DL — SIGNIFICANT CHANGE UP (ref 8.4–10.5)
CHLORIDE SERPL-SCNC: 99 MMOL/L — SIGNIFICANT CHANGE UP (ref 98–107)
CO2 SERPL-SCNC: 30 MMOL/L — SIGNIFICANT CHANGE UP (ref 22–31)
CREAT SERPL-MCNC: 1.34 MG/DL — HIGH (ref 0.5–1.3)
EGFR: 57 ML/MIN/1.73M2 — LOW
GLUCOSE SERPL-MCNC: 173 MG/DL — HIGH (ref 70–99)
HCT VFR BLD CALC: 38.7 % — LOW (ref 39–50)
HGB BLD-MCNC: 12.6 G/DL — LOW (ref 13–17)
MCHC RBC-ENTMCNC: 30 PG — SIGNIFICANT CHANGE UP (ref 27–34)
MCHC RBC-ENTMCNC: 32.6 GM/DL — SIGNIFICANT CHANGE UP (ref 32–36)
MCV RBC AUTO: 92.1 FL — SIGNIFICANT CHANGE UP (ref 80–100)
NRBC # BLD: 0 /100 WBCS — SIGNIFICANT CHANGE UP (ref 0–0)
NRBC # FLD: 0 K/UL — SIGNIFICANT CHANGE UP (ref 0–0)
PLATELET # BLD AUTO: 220 K/UL — SIGNIFICANT CHANGE UP (ref 150–400)
POTASSIUM SERPL-MCNC: 4.4 MMOL/L — SIGNIFICANT CHANGE UP (ref 3.5–5.3)
POTASSIUM SERPL-SCNC: 4.4 MMOL/L — SIGNIFICANT CHANGE UP (ref 3.5–5.3)
PROT SERPL-MCNC: 7.6 G/DL — SIGNIFICANT CHANGE UP (ref 6–8.3)
RBC # BLD: 4.2 M/UL — SIGNIFICANT CHANGE UP (ref 4.2–5.8)
RBC # FLD: 12.6 % — SIGNIFICANT CHANGE UP (ref 10.3–14.5)
SODIUM SERPL-SCNC: 138 MMOL/L — SIGNIFICANT CHANGE UP (ref 135–145)
WBC # BLD: 5.52 K/UL — SIGNIFICANT CHANGE UP (ref 3.8–10.5)
WBC # FLD AUTO: 5.52 K/UL — SIGNIFICANT CHANGE UP (ref 3.8–10.5)

## 2023-01-11 PROCEDURE — 93010 ELECTROCARDIOGRAM REPORT: CPT

## 2023-01-11 RX ORDER — DUTASTERIDE 0.5 MG/1
1 CAPSULE, LIQUID FILLED ORAL
Qty: 0 | Refills: 0 | DISCHARGE

## 2023-01-11 RX ORDER — METOPROLOL TARTRATE 50 MG
1 TABLET ORAL
Qty: 0 | Refills: 0 | DISCHARGE

## 2023-01-11 RX ORDER — AMLODIPINE BESYLATE 2.5 MG/1
1 TABLET ORAL
Qty: 0 | Refills: 0 | DISCHARGE

## 2023-01-11 RX ORDER — METFORMIN HYDROCHLORIDE 850 MG/1
1 TABLET ORAL
Qty: 0 | Refills: 0 | DISCHARGE

## 2023-01-11 RX ORDER — TAMSULOSIN HYDROCHLORIDE 0.4 MG/1
1 CAPSULE ORAL
Qty: 0 | Refills: 0 | DISCHARGE

## 2023-01-11 RX ORDER — ASPIRIN/CALCIUM CARB/MAGNESIUM 324 MG
0 TABLET ORAL
Qty: 0 | Refills: 0 | DISCHARGE

## 2023-01-11 RX ORDER — ATORVASTATIN CALCIUM 80 MG/1
1 TABLET, FILM COATED ORAL
Qty: 0 | Refills: 0 | DISCHARGE

## 2023-01-11 RX ORDER — LISINOPRIL 2.5 MG/1
1 TABLET ORAL
Qty: 0 | Refills: 0 | DISCHARGE

## 2023-01-11 RX ORDER — HYDROCHLOROTHIAZIDE 25 MG
1 TABLET ORAL
Qty: 0 | Refills: 0 | DISCHARGE

## 2023-01-11 RX ORDER — FENOFIBRATE,MICRONIZED 130 MG
1 CAPSULE ORAL
Qty: 0 | Refills: 0 | DISCHARGE

## 2023-01-11 RX ORDER — METOPROLOL TARTRATE 50 MG
0.5 TABLET ORAL
Qty: 0 | Refills: 0 | DISCHARGE

## 2023-01-11 RX ORDER — RIFAMPIN 300 MG
2 CAPSULE ORAL
Qty: 0 | Refills: 0 | DISCHARGE

## 2023-01-11 NOTE — H&P PST ADULT - HISTORY OF PRESENT ILLNESS
Pt is a 72 yr old male scheduled for Transperineal Fusion Guided Focal Cryoablation of the Prostate with Dr Jon tentatively 1/24/23 - pt speaks Mandarin and has difficulty with MH - daughter also assisted and translation services used - pt c/o of difficulty urinating -   Pt given information for COVID PCR testing sites and told to make appointment 3-5 days preop  Pt is a 72 yr old male scheduled for Transperineal Fusion Guided Focal Cryoablation of the Prostate with Dr Jon tentatively 1/24/23 - pt speaks Mandarin and has difficulty with MH - daughter also assisted and translation services used - pt c/o of difficulty urinating - Pt and daughter have difficulty with MH - pt denies hematuria but c/o of lower pelvic discomfort - pt hx HTN, DM, HLD, BPH   Pt postponed from surgery 12/27/22 due to loose tooth which has now been removed   Pt given information for COVID PCR testing sites and told to make appointment 3-5 days preop

## 2023-01-11 NOTE — H&P PST ADULT - NSICDXPASTMEDICALHX_GEN_ALL_CORE_FT
PAST MEDICAL HISTORY:  HTN (hypertension)     Hyperlipidemia     Prostate cancer     Type II diabetes mellitus      PAST MEDICAL HISTORY:  BPH with elevated PSA     HTN (hypertension)     Hyperlipidemia     Prostate cancer     Type II diabetes mellitus

## 2023-01-11 NOTE — H&P PST ADULT - PROBLEM SELECTOR PLAN 1
Pt scheduled for surgery and preop instructions including instructions for taking Famotidine on the day of surgery, given verbally and with use of  written materials, and patient confirming understanding of such instructions using  teach back method.  Pt given information for COVID PCR testing sites and told to make appointment 3-5 days preop   Request copy of MC   given for surgery 12/27/22  Pt to stop ASA 1/16/23

## 2023-01-11 NOTE — H&P PST ADULT - CARDIOVASCULAR COMMENTS
"every morning I exercise, I do arm, leg movement" Mets 4-5 "every morning I exercise, I do arm, leg movement" Mets >4

## 2023-01-12 LAB
CULTURE RESULTS: SIGNIFICANT CHANGE UP
SPECIMEN SOURCE: SIGNIFICANT CHANGE UP

## 2023-01-20 ENCOUNTER — NON-APPOINTMENT (OUTPATIENT)
Age: 73
End: 2023-01-20

## 2023-01-23 ENCOUNTER — TRANSCRIPTION ENCOUNTER (OUTPATIENT)
Age: 73
End: 2023-01-23

## 2023-01-23 VITALS
TEMPERATURE: 99 F | HEART RATE: 67 BPM | OXYGEN SATURATION: 99 % | SYSTOLIC BLOOD PRESSURE: 144 MMHG | RESPIRATION RATE: 20 BRPM | DIASTOLIC BLOOD PRESSURE: 71 MMHG | WEIGHT: 147.71 LBS

## 2023-01-24 ENCOUNTER — TRANSCRIPTION ENCOUNTER (OUTPATIENT)
Age: 73
End: 2023-01-24

## 2023-01-24 ENCOUNTER — OUTPATIENT (OUTPATIENT)
Dept: OUTPATIENT SERVICES | Facility: HOSPITAL | Age: 73
LOS: 1 days | Discharge: HOME | End: 2023-01-24
Payer: COMMERCIAL

## 2023-01-24 ENCOUNTER — APPOINTMENT (OUTPATIENT)
Dept: UROLOGY | Facility: AMBULATORY SURGERY CENTER | Age: 73
End: 2023-01-24

## 2023-01-24 VITALS
DIASTOLIC BLOOD PRESSURE: 66 MMHG | HEART RATE: 56 BPM | RESPIRATION RATE: 16 BRPM | OXYGEN SATURATION: 99 % | SYSTOLIC BLOOD PRESSURE: 140 MMHG | TEMPERATURE: 98 F

## 2023-01-24 DIAGNOSIS — C61 MALIGNANT NEOPLASM OF PROSTATE: ICD-10-CM

## 2023-01-24 LAB — GLUCOSE BLDC GLUCOMTR-MCNC: 106 MG/DL — HIGH (ref 70–99)

## 2023-01-24 PROCEDURE — 55873 CRYOABLATE PROSTATE: CPT

## 2023-01-24 PROCEDURE — 76377 3D RENDER W/INTRP POSTPROCES: CPT | Mod: 26

## 2023-01-24 DEVICE — KIT PROSTATE CRYOABLATION PROCEDURE V-PROBE CRYOPROBE: Type: IMPLANTABLE DEVICE | Site: TRANSPERINEAL | Status: FUNCTIONAL

## 2023-01-24 DEVICE — GUIDEWIRE AMPLATZ SUPER-STIFF .038" X 145CM 3.5CM FLEXIBLE: Type: IMPLANTABLE DEVICE | Site: TRANSPERINEAL | Status: FUNCTIONAL

## 2023-01-24 RX ORDER — METFORMIN HYDROCHLORIDE 850 MG/1
1 TABLET ORAL
Qty: 0 | Refills: 0 | DISCHARGE

## 2023-01-24 RX ORDER — AMLODIPINE BESYLATE 2.5 MG/1
1 TABLET ORAL
Qty: 0 | Refills: 0 | DISCHARGE

## 2023-01-24 RX ORDER — IBUPROFEN 200 MG
1 TABLET ORAL
Qty: 8 | Refills: 0
Start: 2023-01-24 | End: 2023-01-25

## 2023-01-24 RX ORDER — HYDROCHLOROTHIAZIDE 25 MG
1 TABLET ORAL
Qty: 0 | Refills: 0 | DISCHARGE

## 2023-01-24 RX ORDER — DUTASTERIDE 0.5 MG/1
1 CAPSULE, LIQUID FILLED ORAL
Qty: 0 | Refills: 0 | DISCHARGE

## 2023-01-24 RX ORDER — METOPROLOL TARTRATE 50 MG
1 TABLET ORAL
Qty: 0 | Refills: 0 | DISCHARGE

## 2023-01-24 RX ORDER — ASPIRIN/CALCIUM CARB/MAGNESIUM 324 MG
1 TABLET ORAL
Qty: 0 | Refills: 0 | DISCHARGE

## 2023-01-24 RX ORDER — ATORVASTATIN CALCIUM 80 MG/1
1 TABLET, FILM COATED ORAL
Qty: 0 | Refills: 0 | DISCHARGE

## 2023-01-24 RX ORDER — ACETAMINOPHEN 500 MG
1000 TABLET ORAL ONCE
Refills: 0 | Status: DISCONTINUED | OUTPATIENT
Start: 2023-01-24 | End: 2023-02-07

## 2023-01-24 RX ORDER — IBUPROFEN 200 MG
400 TABLET ORAL ONCE
Refills: 0 | Status: DISCONTINUED | OUTPATIENT
Start: 2023-01-24 | End: 2023-02-07

## 2023-01-24 RX ORDER — LISINOPRIL 2.5 MG/1
1 TABLET ORAL
Qty: 0 | Refills: 0 | DISCHARGE

## 2023-01-24 RX ORDER — SODIUM CHLORIDE 9 MG/ML
1000 INJECTION, SOLUTION INTRAVENOUS
Refills: 0 | Status: DISCONTINUED | OUTPATIENT
Start: 2023-01-24 | End: 2023-02-07

## 2023-01-24 RX ORDER — FENOFIBRATE,MICRONIZED 130 MG
1 CAPSULE ORAL
Qty: 0 | Refills: 0 | DISCHARGE

## 2023-01-24 RX ORDER — TAMSULOSIN HYDROCHLORIDE 0.4 MG/1
1 CAPSULE ORAL
Qty: 0 | Refills: 0 | DISCHARGE

## 2023-01-24 NOTE — PACU DISCHARGE NOTE - PAIN:
Controlled with current regime Pt c/o chest pain, sob started 30 mins ago, headache, diaphoretic on presentation.

## 2023-01-24 NOTE — BRIEF OPERATIVE NOTE - OPERATION/FINDINGS
Patient underwent uncomplicated focal cryoablation of the prostate. Given the extent of his disease the patient had a R hemigland ablation with 4 cryoprobes each of which underwent two cycles of freeze-thaw. Patient had urethral warmer during administration of cryo probes.

## 2023-01-24 NOTE — ASU DISCHARGE PLAN (ADULT/PEDIATRIC) - ASU DC SPECIAL INSTRUCTIONSFT
You have a smith catheter to drain your bladder which was placed during the operation. You will go home with this catheter. Tomorrow (date), please take out your smith catheter. The way to remove the smith is by taking a pair of scissors and cutting the balloon port (right under the colored cap). Do this while seated on toilet or standing in shower. About 1 tablespoon of water will drain out. Wait approximately 1 minute and then pull the catheter out. After the catheter is out, you may urinate as you normally do. IF YOU FEEL THAT YOU CANNOT URINATE OR ARE UNCOMFORTABLE, CALL THE OFFICE/EMERGENCY LINE FOR FURTHER INSTRUCTIONS. FOR ANY EMERGENCIES, YOU CAN COME TO THE NEAREST EMERGENCY ROOM FOR ASSESSMENT.     For pain alternate taking tylenol and motrin every 6 hours for the first two days and then as needed. You have a smith catheter to drain your bladder which was placed during the operation. You will go home with this catheter. Tomorrow 1/25/2023, please take out your smith catheter. The way to remove the smith is by taking a pair of scissors and cutting the balloon port (right under the colored cap). Do this while seated on toilet or standing in shower. About 1 tablespoon of water will drain out. Wait approximately 1 minute and then pull the catheter out. After the catheter is out, you may urinate as you normally do. IF YOU FEEL THAT YOU CANNOT URINATE OR ARE UNCOMFORTABLE, CALL THE OFFICE/EMERGENCY LINE FOR FURTHER INSTRUCTIONS. FOR ANY EMERGENCIES, YOU CAN COME TO THE NEAREST EMERGENCY ROOM FOR ASSESSMENT.     For pain alternate taking tylenol and motrin every 6 hours for the first two days and then as needed.

## 2023-01-24 NOTE — ASU DISCHARGE PLAN (ADULT/PEDIATRIC) - CALL YOUR DOCTOR IF YOU HAVE ANY OF THE FOLLOWING:
Bleeding that does not stop/Swelling that gets worse/Pain not relieved by Medications/Fever greater than (need to indicate Fahrenheit or Celsius)/Wound/Surgical Site with redness, or foul smelling discharge or pus/Numbness, tingling, color or temperature change to extremity/Nausea and vomiting that does not stop/Excessive diarrhea/Inability to tolerate liquids or foods Bleeding that does not stop/Swelling that gets worse/Pain not relieved by Medications/Fever greater than (need to indicate Fahrenheit or Celsius)/Wound/Surgical Site with redness, or foul smelling discharge or pus/Numbness, tingling, color or temperature change to extremity/Nausea and vomiting that does not stop/Unable to urinate/Excessive diarrhea/Inability to tolerate liquids or foods

## 2023-01-24 NOTE — ASU DISCHARGE PLAN (ADULT/PEDIATRIC) - MEDICATION INSTRUCTIONS
For pain alternate taking tylenol and motrin every 6 hours for the first two days and then as needed.

## 2023-01-24 NOTE — ASU PREOP CHECKLIST - 1.
speaks mandarin requires [facility]. services . [facility] no. [id number] -[facility]

## 2023-01-24 NOTE — ASU DISCHARGE PLAN (ADULT/PEDIATRIC) - NURSING INSTRUCTIONS
You were given IV Tylenol (1000mg) for pain management in the Operating Room.  Please do NOT take any additional Tylenol/Acetaminophen products (Percocet, Vicodin, Excedrin) for the next 6-8 hours (after 7:45PM TODAY 1/24/2023). Please do not take Tylenol AND   Percocet/Vicodin/Excedrin as narcotic prescription already contains Tylenol.

## 2023-01-24 NOTE — ASU DISCHARGE PLAN (ADULT/PEDIATRIC) - CARE PROVIDER_API CALL
Bear Jon)  Urology  [address] [place] [address], [place] [address]  Phone: [phone]  Fax: [phone]  Follow Up Time: 2 months

## 2023-01-26 PROBLEM — N40.0 BENIGN PROSTATIC HYPERPLASIA WITHOUT LOWER URINARY TRACT SYMPTOMS: Chronic | Status: ACTIVE | Noted: 2023-01-11

## 2023-01-27 ENCOUNTER — OUTPATIENT (OUTPATIENT)
Dept: OUTPATIENT SERVICES | Facility: HOSPITAL | Age: 73
LOS: 1 days | End: 2023-01-27
Payer: COMMERCIAL

## 2023-01-27 ENCOUNTER — APPOINTMENT (OUTPATIENT)
Dept: UROLOGY | Facility: CLINIC | Age: 73
End: 2023-01-27
Payer: COMMERCIAL

## 2023-01-27 VITALS — RESPIRATION RATE: 16 BRPM | DIASTOLIC BLOOD PRESSURE: 76 MMHG | HEART RATE: 69 BPM | SYSTOLIC BLOOD PRESSURE: 130 MMHG

## 2023-01-27 PROCEDURE — 51798 US URINE CAPACITY MEASURE: CPT

## 2023-01-27 PROCEDURE — 51700 IRRIGATION OF BLADDER: CPT | Mod: 58

## 2023-01-27 PROCEDURE — 51700 IRRIGATION OF BLADDER: CPT

## 2023-01-30 ENCOUNTER — APPOINTMENT (OUTPATIENT)
Dept: UROLOGY | Facility: CLINIC | Age: 73
End: 2023-01-30
Payer: COMMERCIAL

## 2023-01-30 ENCOUNTER — NON-APPOINTMENT (OUTPATIENT)
Age: 73
End: 2023-01-30

## 2023-01-30 DIAGNOSIS — C61 MALIGNANT NEOPLASM OF PROSTATE: ICD-10-CM

## 2023-01-30 PROCEDURE — 99024 POSTOP FOLLOW-UP VISIT: CPT

## 2023-01-30 PROCEDURE — 51798 US URINE CAPACITY MEASURE: CPT

## 2023-01-31 NOTE — HISTORY OF PRESENT ILLNESS
[FreeTextEntry1] : Mr Gross is a mandarin speaking gentleman here with his daughter . He had a transperineal fusion focal cryoablation of the prostate . He called today C/O pain on urination and pelvic pain [Urinary Urgency] : urinary urgency [Urinary Frequency] : urinary frequency [5] : 5 [de-identified] : penis and scrotum

## 2023-01-31 NOTE — ASSESSMENT
[FreeTextEntry1] : On exam he has a very edematous penis and scrotum . He has mild discomfort Encouraged to elevated the scrotum , apply ice . \par PVR was 123cc He voided and I sent a culture \par Discussion regarding his penis and scrotum through Carmen Mantilla NP in [facility] .\par Reassured \par Also complaining of constipation Ordered Miralax daily \par Will see Dr Jon on Friday for reevaluation

## 2023-01-31 NOTE — PHYSICAL EXAM
[General Appearance - Well Nourished] : well nourished [General Appearance - Well Developed] : well developed [Bowel Sounds] : normal bowel sounds [FreeTextEntry1] : penis and scrotum very edematous Ecchymosis noted on scrotum [Heart Rate And Rhythm] : Heart rate and rhythm were normal [] : no respiratory distress [Normal Station and Gait] : the gait and station were normal for the patient's age [No Focal Deficits] : no focal deficits

## 2023-02-03 ENCOUNTER — APPOINTMENT (OUTPATIENT)
Dept: UROLOGY | Facility: CLINIC | Age: 73
End: 2023-02-03
Payer: COMMERCIAL

## 2023-02-03 DIAGNOSIS — N50.89 OTHER SPECIFIED DISORDERS OF THE MALE GENITAL ORGANS: ICD-10-CM

## 2023-02-03 LAB — BACTERIA UR CULT: ABNORMAL

## 2023-02-03 PROCEDURE — 99024 POSTOP FOLLOW-UP VISIT: CPT

## 2023-02-03 NOTE — HISTORY OF PRESENT ILLNESS
[FreeTextEntry1] : Gerardo Gross returns to the office today.  He is a 72-year-old man recently status post cryoablation of the prostate.  The right hemigland was ablated for multiple foci of prostate cancer on that side.  The catheter was left in place after the procedure which has subsequently been removed and the patient has been voiding on his own now.  He was seen earlier in the week because he had developed some scrotal ecchymosis and swelling. ZORA Abdi saw the patient and we started him on antibiotics, Keflex to ensure there was no evidence of any type of scrotal infection and also because his urine culture done at that visit had shown coag negative staph.  He is currently feeling well and does feel as though the edema and swelling are dissipating.  He reports he is urinating without much difficulty.  He does report some feelings of lightheadedness and a "floating" sensation at times when he stands and walks around his home.  He has ambulated into the office today here without difficulty.

## 2023-02-03 NOTE — PHYSICAL EXAM
[General Appearance - Well Developed] : well developed [General Appearance - Well Nourished] : well nourished [Normal Appearance] : normal appearance [Well Groomed] : well groomed [General Appearance - In No Acute Distress] : no acute distress [Abdomen Soft] : soft [Abdomen Tenderness] : non-tender [Costovertebral Angle Tenderness] : no ~M costovertebral angle tenderness [Urethral Meatus] : meatus normal [Urinary Bladder Findings] : the bladder was normal on palpation [FreeTextEntry1] : Examination today shows that the scrotal edema, while still present, is fairly mild.  There is resolving ecchymosis over the skin on the left hemiscrotum.  There is no hematoma present. [Edema] : no peripheral edema [] : no respiratory distress [Respiration, Rhythm And Depth] : normal respiratory rhythm and effort [Exaggerated Use Of Accessory Muscles For Inspiration] : no accessory muscle use [Oriented To Time, Place, And Person] : oriented to person, place, and time [Affect] : the affect was normal [Mood] : the mood was normal [Not Anxious] : not anxious [Normal Station and Gait] : the gait and station were normal for the patient's age [No Focal Deficits] : no focal deficits [No Palpable Adenopathy] : no palpable adenopathy

## 2023-02-03 NOTE — ASSESSMENT
[FreeTextEntry1] : The scrotal edema and ecchymosis does appear to be significantly improving based on comparison with his examination earlier in the week.  I have encouraged him to complete the course of Keflex which she was prescribed for the coag negative staph seen in his urine culture.\par \par He will be returning to see me in the office in mid March for evaluation of his PSA for the first time in the posttreatment setting.  I have encouraged him to contact me if he encounters any difficulty with urination.  I also encouraged him to follow-up with his primary care physician about the feelings of lightheadedness or dizziness which at this point would not be expected to be related to the recent prostate procedure.

## 2023-02-27 RX ORDER — GABAPENTIN 100 MG/1
100 CAPSULE ORAL
Qty: 90 | Refills: 0 | Status: ACTIVE | COMMUNITY
Start: 2023-01-06 | End: 1900-01-01

## 2023-03-14 ENCOUNTER — APPOINTMENT (OUTPATIENT)
Dept: UROLOGY | Facility: CLINIC | Age: 73
End: 2023-03-14
Payer: COMMERCIAL

## 2023-03-14 VITALS
BODY MASS INDEX: 23.04 KG/M2 | WEIGHT: 152 LBS | DIASTOLIC BLOOD PRESSURE: 75 MMHG | HEART RATE: 64 BPM | RESPIRATION RATE: 16 BRPM | SYSTOLIC BLOOD PRESSURE: 128 MMHG | HEIGHT: 68 IN

## 2023-03-14 PROCEDURE — 99024 POSTOP FOLLOW-UP VISIT: CPT

## 2023-03-15 LAB
PSA FREE FLD-MCNC: 28 %
PSA FREE SERPL-MCNC: 0.33 NG/ML
PSA SERPL-MCNC: 1.2 NG/ML

## 2023-03-17 NOTE — LETTER GREETING
[Dear  ___] : Dear  [unfilled], [Follow-Up] : Your patient, [unfilled] was seen in my office today for follow-up [Please see my note below.] : Please see my note below. [FreeTextEntry2] : Kamini Gross. DO\par [address] [place] [address]

## 2023-03-17 NOTE — LETTER CLOSING
[Consult Closing:] : Thank you for allowing me to participate in the care of this patient.  If you have any questions, please do not hesitate to contact me. [FreeTextEntry3] : Sincerely,\par \par \par \par \par Bear Jon MD, FACS\par Chief of Urology, [facility]\par  of Urology\par Director of Robotic and Laparoscopic Surgery\par [facility]\par \par [facility]\par [address] [place] [address]\[place] [address]\par P: [phone]\par F: [phone]\par [facility]

## 2023-03-17 NOTE — DISEASE MANAGEMENT
[1] : T1 [c] : c [0] : N0 [X] : MX [Biopsy with Fusion] : Patient had a biopsy with fusion on [7(3+4)] : Template Biopsy [place] Score: 7(3+4) [Biopsy results sent to PCP/Referring Physician] : Biopsy results sent to PCP/Referring Physician [] : Patient had a Prostate MRI [3] : 3 [BiopsyDate] : 11/22 [MeasuredProstateVolume] : 43.2 [TotalCores] : 14 [TotalPositiveCores] : 5 [MaxCoreInvolvement] : 50 [IIB] : IIB [Focal Ablation] : Focal Ablation

## 2023-03-17 NOTE — HISTORY OF PRESENT ILLNESS
[FreeTextEntry1] : Gerardo Gross returns to the office today.  He is 72 years old and recently underwent a focal cryoablation of the prostate performed in late January 2023.  He has developed some mild stress incontinence after the procedure.  He also had noted some blood in his urine although that does seem to be dissipating.  Left hip discomfort was also present after the procedure.  The patient states that this has been getting a bit better over time since the surgery but is still noticeable.  He has trouble walking for any significant distances related to the pain.  \par \par There is no perineal pain.  He denies any abdominal or flank pain.  No recent hematuria and his urinary stream seems to be flowing well without hesitancy or sensation of incomplete bladder emptying.\par \par

## 2023-05-01 ENCOUNTER — APPOINTMENT (OUTPATIENT)
Dept: UROLOGY | Facility: CLINIC | Age: 73
End: 2023-05-01

## 2023-07-18 ENCOUNTER — APPOINTMENT (OUTPATIENT)
Dept: UROLOGY | Facility: CLINIC | Age: 73
End: 2023-07-18
Payer: COMMERCIAL

## 2023-07-18 DIAGNOSIS — E11.9 TYPE 2 DIABETES MELLITUS W/OUT COMPLICATIONS: ICD-10-CM

## 2023-07-18 PROCEDURE — 99214 OFFICE O/P EST MOD 30 MIN: CPT

## 2023-07-18 NOTE — REASON FOR VISIT
[Routine Follow-Up] : a routine follow-up visit for prostate cancer [[employer] ] : provided by [employer]   [Interpreters_IDNumber] : [id number] [Interpreters_FullName] : Lela [TWNoteComboBox1] : Chinese

## 2023-07-18 NOTE — HISTORY OF PRESENT ILLNESS
[FreeTextEntry1] : ELÍAS CARRERA returns to the office today. He is a 72 year old man who underwent focal cryoablation of the prostate  in January 2023. After the procedure he experience some mild stress incontinence as well as left hip discomfort.  Incontinence has resolved.  He does have some residual symptoms of nocturia which were present at baseline even before treatment.  He generally wakes up 2 or 3 times overnight to urinate and does drink water or tea before he goes to bed. He denies hematuria or dysuria.\par  \par PSA was last checked in March and was 1.2ng/mL, prior to treatment was 6.87ng/mL. \par \par He has been feeling dizzy for the last couple of months as well as pain at the bottom of his feet.  The patient is diabetic.

## 2023-07-18 NOTE — DISEASE MANAGEMENT
[1] : T1 [c] : c [0] : N0 [X] : MX [Biopsy with Fusion] : Patient had a biopsy with fusion on [7(3+4)] : Template Biopsy [place] Score: 7(3+4) [Biopsy results sent to PCP/Referring Physician] : Biopsy results sent to PCP/Referring Physician [] : Patient had a Prostate MRI [3] : 3 [IIB] : IIB [Focal Ablation] : Focal Ablation [BiopsyDate] : 11/22 [MeasuredProstateVolume] : 43.2 [TotalCores] : 14 [TotalPositiveCores] : 5 [MaxCoreInvolvement] : 50

## 2023-07-18 NOTE — LETTER GREETING
[Dear  ___] : Dear  [unfilled], [Follow-Up] : Your patient, [unfilled] was seen in my office today for follow-up [Please see my note below.] : Please see my note below. [FreeTextEntry2] : Kamini Gross. DO\par [address] [place] [address]

## 2023-07-18 NOTE — LETTER CLOSING
[Consult Closing:] : Thank you for allowing me to participate in the care of this patient.  If you have any questions, please do not hesitate to contact me. [FreeTextEntry3] : Sincerely,\par \par \par \par \par Bear Jon MD, FACS\par Chief of Urology, [facility]\par  of Urology\par Director of Robotic and Laparoscopic Surgery\par [facility] at [facility]\par \par [facility]\par [address] [place] [address]\[place] [address]\par P: [phone]\par F: [phone]\par [facility]

## 2023-07-21 LAB
PSA FREE FLD-MCNC: 31 %
PSA FREE SERPL-MCNC: 0.36 NG/ML
PSA SERPL-MCNC: 1.16 NG/ML

## 2023-08-17 NOTE — REASON FOR VISIT
Administered By (Optional): Dr. Pina [Initial Visit ___] : [unfilled] is here today for an initial visit  for [unfilled]

## 2023-10-09 ENCOUNTER — RX RENEWAL (OUTPATIENT)
Age: 73
End: 2023-10-09

## 2023-11-07 ENCOUNTER — APPOINTMENT (OUTPATIENT)
Dept: UROLOGY | Facility: CLINIC | Age: 73
End: 2023-11-07
Payer: COMMERCIAL

## 2023-11-07 VITALS
WEIGHT: 152 LBS | DIASTOLIC BLOOD PRESSURE: 83 MMHG | HEIGHT: 68 IN | OXYGEN SATURATION: 99 % | TEMPERATURE: 98 F | SYSTOLIC BLOOD PRESSURE: 124 MMHG | HEART RATE: 93 BPM | BODY MASS INDEX: 23.04 KG/M2

## 2023-11-07 DIAGNOSIS — N40.1 BENIGN PROSTATIC HYPERPLASIA WITH LOWER URINARY TRACT SYMPMS: ICD-10-CM

## 2023-11-07 DIAGNOSIS — Z87.898 PERSONAL HISTORY OF OTHER SPECIFIED CONDITIONS: ICD-10-CM

## 2023-11-07 DIAGNOSIS — C61 MALIGNANT NEOPLASM OF PROSTATE: ICD-10-CM

## 2023-11-07 DIAGNOSIS — N13.8 BENIGN PROSTATIC HYPERPLASIA WITH LOWER URINARY TRACT SYMPMS: ICD-10-CM

## 2023-11-07 PROCEDURE — 99214 OFFICE O/P EST MOD 30 MIN: CPT

## 2023-11-08 LAB
PSA FREE FLD-MCNC: 33 %
PSA FREE SERPL-MCNC: 0.36 NG/ML
PSA SERPL-MCNC: 1.09 NG/ML

## 2023-11-10 PROBLEM — Z87.898 HISTORY OF ELEVATED PROSTATE SPECIFIC ANTIGEN (PSA): Status: RESOLVED | Noted: 2022-10-03 | Resolved: 2023-11-10

## 2023-11-10 PROBLEM — C61 PROSTATE CANCER: Noted: 2023-07-18

## 2023-11-10 PROBLEM — N40.1 BPH WITH OBSTRUCTION/LOWER URINARY TRACT SYMPTOMS: Status: ACTIVE | Noted: 2022-10-03

## 2023-11-10 RX ORDER — CEPHALEXIN 500 MG/1
500 CAPSULE ORAL 3 TIMES DAILY
Qty: 15 | Refills: 0 | Status: DISCONTINUED | COMMUNITY
Start: 2023-02-02 | End: 2023-11-10

## 2023-12-31 ENCOUNTER — RESULT REVIEW (OUTPATIENT)
Age: 73
End: 2023-12-31

## 2023-12-31 ENCOUNTER — OUTPATIENT (OUTPATIENT)
Dept: OUTPATIENT SERVICES | Facility: HOSPITAL | Age: 73
LOS: 1 days | End: 2023-12-31
Payer: COMMERCIAL

## 2023-12-31 ENCOUNTER — APPOINTMENT (OUTPATIENT)
Dept: MRI IMAGING | Facility: IMAGING CENTER | Age: 73
End: 2023-12-31
Payer: COMMERCIAL

## 2023-12-31 DIAGNOSIS — Z00.8 ENCOUNTER FOR OTHER GENERAL EXAMINATION: ICD-10-CM

## 2023-12-31 DIAGNOSIS — C61 MALIGNANT NEOPLASM OF PROSTATE: ICD-10-CM

## 2023-12-31 PROCEDURE — 76498 UNLISTED MR PROCEDURE: CPT

## 2023-12-31 PROCEDURE — 72197 MRI PELVIS W/O & W/DYE: CPT | Mod: 26

## 2023-12-31 PROCEDURE — 72197 MRI PELVIS W/O & W/DYE: CPT

## 2023-12-31 PROCEDURE — 76498P: CUSTOM | Mod: 26

## 2023-12-31 PROCEDURE — A9585: CPT

## 2024-01-05 ENCOUNTER — NON-APPOINTMENT (OUTPATIENT)
Age: 74
End: 2024-01-05

## 2024-01-09 ENCOUNTER — NON-APPOINTMENT (OUTPATIENT)
Age: 74
End: 2024-01-09

## 2024-01-10 ENCOUNTER — OUTPATIENT (OUTPATIENT)
Dept: OUTPATIENT SERVICES | Facility: HOSPITAL | Age: 74
LOS: 1 days | End: 2024-01-10
Payer: COMMERCIAL

## 2024-01-10 DIAGNOSIS — Z00.8 ENCOUNTER FOR OTHER GENERAL EXAMINATION: ICD-10-CM

## 2024-01-10 PROCEDURE — C8001: CPT

## 2024-01-11 ENCOUNTER — APPOINTMENT (OUTPATIENT)
Dept: UROLOGY | Facility: CLINIC | Age: 74
End: 2024-01-11
Payer: COMMERCIAL

## 2024-01-11 ENCOUNTER — OUTPATIENT (OUTPATIENT)
Dept: OUTPATIENT SERVICES | Facility: HOSPITAL | Age: 74
LOS: 1 days | End: 2024-01-11
Payer: COMMERCIAL

## 2024-01-11 VITALS — DIASTOLIC BLOOD PRESSURE: 72 MMHG | HEART RATE: 72 BPM | SYSTOLIC BLOOD PRESSURE: 137 MMHG

## 2024-01-11 VITALS — SYSTOLIC BLOOD PRESSURE: 141 MMHG | HEART RATE: 69 BPM | DIASTOLIC BLOOD PRESSURE: 78 MMHG

## 2024-01-11 DIAGNOSIS — R35.0 FREQUENCY OF MICTURITION: ICD-10-CM

## 2024-01-11 PROCEDURE — 55700: CPT

## 2024-01-11 PROCEDURE — 76999 ECHO EXAMINATION PROCEDURE: CPT

## 2024-01-11 PROCEDURE — 76999F: CUSTOM | Mod: 26

## 2024-01-12 ENCOUNTER — NON-APPOINTMENT (OUTPATIENT)
Age: 74
End: 2024-01-12

## 2024-01-12 DIAGNOSIS — C61 MALIGNANT NEOPLASM OF PROSTATE: ICD-10-CM

## 2024-01-18 ENCOUNTER — APPOINTMENT (OUTPATIENT)
Dept: UROLOGY | Facility: CLINIC | Age: 74
End: 2024-01-18
Payer: COMMERCIAL

## 2024-01-18 DIAGNOSIS — C61 MALIGNANT NEOPLASM OF PROSTATE: ICD-10-CM

## 2024-01-18 LAB — PROSTATE BIOPSY: NORMAL

## 2024-01-18 PROCEDURE — 99214 OFFICE O/P EST MOD 30 MIN: CPT

## 2024-01-19 ENCOUNTER — TRANSCRIPTION ENCOUNTER (OUTPATIENT)
Age: 74
End: 2024-01-19

## 2024-01-20 PROBLEM — C61 CANCER OF PROSTATE WITH INTERMEDIATE RECURRENCE RISK (STAGE T2B-C OR GLEASON 7 OR PSA 10-20): Status: ACTIVE | Noted: 2022-11-15

## 2024-01-20 NOTE — HISTORY OF PRESENT ILLNESS
[FreeTextEntry1] : The entirety of the visit today was held with the assistance of a Thom/De dialect Chinese  present virtually.    Gerardo Gross returns to the office today.  He is 73 years old and had treatment with focal cryoablation of the prostate to treat a Linda 3+4 prostate cancer 1 year ago.  He just recently underwent follow-up prostate biopsy to reassess.  MRI of the prostate done in the posttreatment setting had shown scarring in the prior ablation zone.  There was also a lesion that had been seen, PI-RADS category 3, at the left base in the peripheral zone posterior medial region.  He is back today to review the biopsy results.  He feels well since the biopsy procedure.  No fevers or chills.  No residual hematuria.  He has no residual pain.

## 2024-01-20 NOTE — LETTER CLOSING
[FreeTextEntry3] : Sincerely,    Bear Jon MD, FACS Chief of Urology, [facility]  of Urology  [facility] [address] [place] [address] P: [phone] F: [phone] [facility]

## 2024-01-20 NOTE — LETTER GREETING
[Dear  ___] : Dear  [unfilled], [Follow-Up] : Your patient, [unfilled] was seen in my office today for follow-up [Please see my note below.] : Please see my note below. [FreeTextEntry2] : Kamini Gross. DO [address] [place] [id number]

## 2024-01-20 NOTE — DISEASE MANAGEMENT
[1] : T1 [c] : c [0] : N0 [X] : MX [Biopsy with Fusion] : Patient had a biopsy with fusion on [7(3+4)] : Template Biopsy [place] Score: 7(3+4) [Biopsy results sent to PCP/Referring Physician] : Biopsy results sent to PCP/Referring Physician [] : Patient had a Prostate MRI [3] : 3 [IIB] : IIB [Focal Ablation] : Focal Ablation [BiopsyDate] : 11/22 [MeasuredProstateVolume] : 43.2 [TotalPositiveCores] : 5 [TotalCores] : 14 [MaxCoreInvolvement] : 50

## 2024-01-23 ENCOUNTER — APPOINTMENT (OUTPATIENT)
Dept: UROLOGY | Facility: CLINIC | Age: 74
End: 2024-01-23
Payer: COMMERCIAL

## 2024-01-23 VITALS
HEART RATE: 77 BPM | SYSTOLIC BLOOD PRESSURE: 113 MMHG | TEMPERATURE: 97.1 F | DIASTOLIC BLOOD PRESSURE: 63 MMHG | BODY MASS INDEX: 23.64 KG/M2 | WEIGHT: 156 LBS | OXYGEN SATURATION: 97 % | RESPIRATION RATE: 16 BRPM | HEIGHT: 68 IN

## 2024-01-23 DIAGNOSIS — N48.1 BALANITIS: ICD-10-CM

## 2024-01-23 PROCEDURE — G2211 COMPLEX E/M VISIT ADD ON: CPT

## 2024-01-23 PROCEDURE — 99214 OFFICE O/P EST MOD 30 MIN: CPT

## 2024-01-23 RX ORDER — TAMSULOSIN HYDROCHLORIDE 0.4 MG/1
0.4 CAPSULE ORAL
Qty: 90 | Refills: 3 | Status: ACTIVE | COMMUNITY
Start: 2022-10-03 | End: 1900-01-01

## 2024-01-23 RX ORDER — FINASTERIDE 5 MG/1
5 TABLET, FILM COATED ORAL
Qty: 90 | Refills: 3 | Status: ACTIVE | COMMUNITY
Start: 2022-10-03 | End: 1900-01-01

## 2024-01-23 RX ORDER — CLOTRIMAZOLE AND BETAMETHASONE DIPROPIONATE 10; .5 MG/G; MG/G
1-0.05 CREAM TOPICAL TWICE DAILY
Qty: 1 | Refills: 3 | Status: ACTIVE | COMMUNITY
Start: 2024-01-23 | End: 1900-01-01

## 2024-01-23 NOTE — LETTER BODY
[FreeTextEntry1] : Kamini Gross DO  [address],  [place] [id number]  [phone]    Dear Dr. Gross,    Reason for visit: Prostate cancer on active surveillance. Perineal discomfort. Balanitis.    This is a 73 year old gentleman with BPH and prostate cancer on active surveillance, status post prostate biopsy. The patient returns today for follow up. Since he was last seen, the patient reports taking Flomax and Proscar. Patient reports taking the medications regularly without any side effects or difficulties with the medication. Patient underwent biopsy this month that was negative for malignancy.  He denies any fevers or chills. He denies any pain. Patient reports of balanitis. The past medical history and family history and social history are unchanged. All other review of systems are negative. Patient denies any changes in medications. Medication list was reconciled.    On examination, the patient is a healthy-appearing gentleman in no acute distress. He is alert and oriented follows commands. He has normal mood and affect. He is normocephalic. Neck is supple. Oral no thrush Respirations are unlabored. His abdomen is soft and nontender. Bladder is nonpalpable. No CVA tenderness. Neurologically he is grossly intact. No peripheral edema. Skin without gross abnormality. He has normal male external genitalia. Normal meatus. Bilateral testes are descended intrascrotally and normal to palpation. On rectal examination, there is normal sphincter tone. The prostate is clinically benign without focal induration or nodularity.    Assessment: Prostate cancer. Perineal discomfort. Possible prostatitis. Chronic pelvic pain syndroe. Balanitis.    I counseled the patient. In terms of his BPH, patient has stable symptoms on medical therapy. I renewed the patient's prescription for Flomax and Proscar today. I encouraged the patient to continue medications regularly as directed. In terms of his balanitis, I recommended the patient start a trial of Clotrimazole-Betamethasone cream. I discussed the potential side effects of the medication. I counseled the patient on its use and side effects. If the patient develops any side effects, the patient will discontinue the medication and contact me. Risks and alternatives were discussed. I answered the patient's questions. The patient will follow-up as directed and will contact me with any questions or concerns or he has made a decision regarding the treatment of his prostate cancer.    Plan: Trial of Clotrimazole-Betamethasone. Continue Flomax and Proscar.  Followup in 1 week.

## 2024-01-23 NOTE — ADDENDUM
[FreeTextEntry1] : Entered by Yuri Potter, acting as scribe for Dr. Danish Waldrop. The documentation recorded by the scribe accurately reflects the service I personally performed and the decisions made by me.

## 2024-02-05 NOTE — H&P ADULT - GASTROINTESTINAL DETAILS
Quality 226: Preventive Care And Screening: Tobacco Use: Screening And Cessation Intervention: Patient screened for tobacco use and is an ex/non-smoker
Detail Level: Detailed
Quality 130: Documentation Of Current Medications In The Medical Record: Current Medications Documented
Quality 431: Preventive Care And Screening: Unhealthy Alcohol Use - Screening: Patient not identified as an unhealthy alcohol user when screened for unhealthy alcohol use using a systematic screening method
soft/nontender/no distention/bowel sounds normal

## 2024-03-19 ENCOUNTER — APPOINTMENT (OUTPATIENT)
Dept: UROLOGY | Facility: CLINIC | Age: 74
End: 2024-03-19

## 2024-07-18 ENCOUNTER — APPOINTMENT (OUTPATIENT)
Dept: UROLOGY | Facility: CLINIC | Age: 74
End: 2024-07-18
Payer: COMMERCIAL

## 2024-07-18 VITALS — OXYGEN SATURATION: 98 % | HEART RATE: 56 BPM | SYSTOLIC BLOOD PRESSURE: 111 MMHG | DIASTOLIC BLOOD PRESSURE: 67 MMHG

## 2024-07-18 DIAGNOSIS — R10.2 PELVIC AND PERINEAL PAIN: ICD-10-CM

## 2024-07-18 DIAGNOSIS — N13.8 BENIGN PROSTATIC HYPERPLASIA WITH LOWER URINARY TRACT SYMPMS: ICD-10-CM

## 2024-07-18 DIAGNOSIS — N40.1 BENIGN PROSTATIC HYPERPLASIA WITH LOWER URINARY TRACT SYMPMS: ICD-10-CM

## 2024-07-18 DIAGNOSIS — C61 MALIGNANT NEOPLASM OF PROSTATE: ICD-10-CM

## 2024-07-18 PROCEDURE — G2211 COMPLEX E/M VISIT ADD ON: CPT

## 2024-07-18 PROCEDURE — 99214 OFFICE O/P EST MOD 30 MIN: CPT

## 2024-07-19 LAB
PSA FREE FLD-MCNC: 30 %
PSA FREE SERPL-MCNC: 0.28 NG/ML
PSA SERPL-MCNC: 0.94 NG/ML

## 2025-01-17 ENCOUNTER — NON-APPOINTMENT (OUTPATIENT)
Age: 75
End: 2025-01-17

## 2025-01-21 ENCOUNTER — APPOINTMENT (OUTPATIENT)
Dept: UROLOGY | Facility: CLINIC | Age: 75
End: 2025-01-21
Payer: COMMERCIAL

## 2025-01-21 VITALS — HEART RATE: 61 BPM | SYSTOLIC BLOOD PRESSURE: 134 MMHG | OXYGEN SATURATION: 97 % | DIASTOLIC BLOOD PRESSURE: 76 MMHG

## 2025-01-21 DIAGNOSIS — C61 MALIGNANT NEOPLASM OF PROSTATE: ICD-10-CM

## 2025-01-21 DIAGNOSIS — N13.8 BENIGN PROSTATIC HYPERPLASIA WITH LOWER URINARY TRACT SYMPMS: ICD-10-CM

## 2025-01-21 DIAGNOSIS — R82.90 UNSPECIFIED ABNORMAL FINDINGS IN URINE: ICD-10-CM

## 2025-01-21 DIAGNOSIS — N40.1 BENIGN PROSTATIC HYPERPLASIA WITH LOWER URINARY TRACT SYMPMS: ICD-10-CM

## 2025-01-21 PROCEDURE — G2211 COMPLEX E/M VISIT ADD ON: CPT

## 2025-01-21 PROCEDURE — 99214 OFFICE O/P EST MOD 30 MIN: CPT

## 2025-01-23 LAB
APPEARANCE: CLEAR
BACTERIA UR CULT: NORMAL
BACTERIA: NEGATIVE /HPF
BILIRUBIN URINE: NEGATIVE
BLOOD URINE: ABNORMAL
CAST: 0 /LPF
COLOR: YELLOW
EPITHELIAL CELLS: 0 /HPF
GLUCOSE QUALITATIVE U: NEGATIVE MG/DL
KETONES URINE: NEGATIVE MG/DL
LEUKOCYTE ESTERASE URINE: NEGATIVE
MICROSCOPIC-UA: NORMAL
NITRITE URINE: NEGATIVE
PH URINE: 7
PROTEIN URINE: 30 MG/DL
PSA FREE FLD-MCNC: 32 %
PSA FREE SERPL-MCNC: 0.35 NG/ML
PSA SERPL-MCNC: 1.1 NG/ML
RED BLOOD CELLS URINE: 3 /HPF
SPECIFIC GRAVITY URINE: 1.02
UROBILINOGEN URINE: 0.2 MG/DL
WHITE BLOOD CELLS URINE: 0 /HPF

## (undated) DEVICE — BALLOON ENDOCAVITY 2X14CM

## (undated) DEVICE — VENODYNE/SCD SLEEVE CALF LARGE

## (undated) DEVICE — Device

## (undated) DEVICE — WARMING BLANKET UPPER ADULT

## (undated) DEVICE — GRID BRACHYTHERAPY EZ 18G

## (undated) DEVICE — BASIN SET DOUBLE

## (undated) DEVICE — DRAPE C ARM BAND BAG

## (undated) DEVICE — POSITIONER FOAM EGG CRATE ULNAR 2PCS (PINK)

## (undated) DEVICE — DRSG TELFA 3 X 8

## (undated) DEVICE — NDL MAX CORE 18G X 25CM

## (undated) DEVICE — SYR LUER LOK 20CC

## (undated) DEVICE — GOWN XXXL

## (undated) DEVICE — SUT ETHILON 2-0 30" KS

## (undated) DEVICE — GLV 8 PROTEXIS (WHITE)